# Patient Record
Sex: MALE | Race: WHITE | NOT HISPANIC OR LATINO | Employment: OTHER | URBAN - METROPOLITAN AREA
[De-identification: names, ages, dates, MRNs, and addresses within clinical notes are randomized per-mention and may not be internally consistent; named-entity substitution may affect disease eponyms.]

---

## 2018-11-07 ENCOUNTER — OFFICE VISIT CONVERTED (OUTPATIENT)
Dept: SURGERY | Facility: CLINIC | Age: 78
End: 2018-11-07
Attending: NURSE PRACTITIONER

## 2019-02-22 ENCOUNTER — HOSPITAL ENCOUNTER (OUTPATIENT)
Dept: SURGERY | Facility: HOSPITAL | Age: 79
Setting detail: HOSPITAL OUTPATIENT SURGERY
Discharge: HOME | End: 2019-02-22
Attending: SURGERY

## 2019-02-22 LAB — GLUCOSE BLD-MCNC: 109 MG/DL (ref 70–99)

## 2019-03-12 ENCOUNTER — OFFICE VISIT CONVERTED (OUTPATIENT)
Dept: SURGERY | Facility: CLINIC | Age: 79
End: 2019-03-12
Attending: NURSE PRACTITIONER

## 2019-04-12 ENCOUNTER — HOSPITAL ENCOUNTER (OUTPATIENT)
Dept: GENERAL RADIOLOGY | Facility: HOSPITAL | Age: 79
Discharge: HOME | End: 2019-04-12
Attending: FAMILY MEDICINE

## 2019-04-19 ENCOUNTER — HOSPITAL ENCOUNTER (OUTPATIENT)
Dept: LAB | Facility: HOSPITAL | Age: 79
Discharge: HOME | End: 2019-04-19
Attending: NURSE PRACTITIONER

## 2019-05-02 ENCOUNTER — OFFICE VISIT CONVERTED (OUTPATIENT)
Dept: ORTHOPEDIC SURGERY | Facility: CLINIC | Age: 79
End: 2019-05-02
Attending: ORTHOPAEDIC SURGERY

## 2019-05-02 ENCOUNTER — CONVERSION ENCOUNTER (OUTPATIENT)
Dept: ORTHOPEDIC SURGERY | Facility: CLINIC | Age: 79
End: 2019-05-02

## 2019-05-30 ENCOUNTER — HOSPITAL ENCOUNTER (OUTPATIENT)
Dept: PREADMISSION TESTING | Facility: HOSPITAL | Age: 79
Discharge: HOME | End: 2019-05-30
Attending: ORTHOPAEDIC SURGERY

## 2019-05-30 LAB
ANION GAP SERPL CALC-SCNC: 14 MMOL/L (ref 8–19)
APTT BLD: 24.7 S (ref 22.2–34.2)
BASOPHILS # BLD AUTO: 0.03 10*3/UL (ref 0–0.2)
BASOPHILS NFR BLD AUTO: 0.5 % (ref 0–3)
BUN SERPL-MCNC: 12 MG/DL (ref 5–25)
BUN/CREAT SERPL: 13 {RATIO} (ref 6–20)
CALCIUM SERPL-MCNC: 9.1 MG/DL (ref 8.7–10.4)
CHLORIDE SERPL-SCNC: 104 MMOL/L (ref 99–111)
CONV ABS IMM GRAN: 0.02 10*3/UL (ref 0–0.2)
CONV CO2: 26 MMOL/L (ref 22–32)
CONV IMMATURE GRAN: 0.3 % (ref 0–1.8)
CREAT UR-MCNC: 0.94 MG/DL (ref 0.7–1.2)
DEPRECATED RDW RBC AUTO: 42.6 FL (ref 35.1–43.9)
EOSINOPHIL # BLD AUTO: 0.21 10*3/UL (ref 0–0.7)
EOSINOPHIL # BLD AUTO: 3.3 % (ref 0–7)
ERYTHROCYTE [DISTWIDTH] IN BLOOD BY AUTOMATED COUNT: 13.3 % (ref 11.6–14.4)
GFR SERPLBLD BASED ON 1.73 SQ M-ARVRAT: >60 ML/MIN/{1.73_M2}
GLUCOSE SERPL-MCNC: 143 MG/DL (ref 70–99)
HBA1C MFR BLD: 15.7 G/DL (ref 14–18)
HCT VFR BLD AUTO: 46.7 % (ref 42–52)
INR PPP: 0.97 (ref 2–3)
LYMPHOCYTES # BLD AUTO: 2.38 10*3/UL (ref 1–5)
MCH RBC QN AUTO: 29.5 PG (ref 27–31)
MCHC RBC AUTO-ENTMCNC: 33.6 G/DL (ref 33–37)
MCV RBC AUTO: 87.6 FL (ref 80–96)
MONOCYTES # BLD AUTO: 0.48 10*3/UL (ref 0.2–1.2)
MONOCYTES NFR BLD AUTO: 7.4 % (ref 3–10)
NEUTROPHILS # BLD AUTO: 3.33 10*3/UL (ref 2–8)
NEUTROPHILS NFR BLD AUTO: 51.6 % (ref 30–85)
NRBC CBCN: 0 % (ref 0–0.7)
OSMOLALITY SERPL CALC.SUM OF ELEC: 292 MOSM/KG (ref 273–304)
PLATELET # BLD AUTO: 171 10*3/UL (ref 130–400)
PMV BLD AUTO: 9.6 FL (ref 9.4–12.4)
POTASSIUM SERPL-SCNC: 4.2 MMOL/L (ref 3.5–5.3)
PROTHROMBIN TIME: 10.1 S (ref 9.4–12)
RBC # BLD AUTO: 5.33 10*6/UL (ref 4.7–6.1)
SODIUM SERPL-SCNC: 140 MMOL/L (ref 135–147)
VARIANT LYMPHS NFR BLD MANUAL: 36.9 % (ref 20–45)
WBC # BLD AUTO: 6.45 10*3/UL (ref 4.8–10.8)

## 2019-06-13 ENCOUNTER — HOSPITAL ENCOUNTER (OUTPATIENT)
Dept: PHYSICAL THERAPY | Facility: CLINIC | Age: 79
Setting detail: RECURRING SERIES
Discharge: HOME | End: 2019-09-18
Attending: ORTHOPAEDIC SURGERY

## 2019-06-20 ENCOUNTER — OFFICE VISIT CONVERTED (OUTPATIENT)
Dept: ORTHOPEDIC SURGERY | Facility: CLINIC | Age: 79
End: 2019-06-20
Attending: PHYSICIAN ASSISTANT

## 2019-08-01 ENCOUNTER — OFFICE VISIT CONVERTED (OUTPATIENT)
Dept: ORTHOPEDIC SURGERY | Facility: CLINIC | Age: 79
End: 2019-08-01
Attending: PHYSICIAN ASSISTANT

## 2019-08-01 ENCOUNTER — CONVERSION ENCOUNTER (OUTPATIENT)
Dept: ORTHOPEDIC SURGERY | Facility: CLINIC | Age: 79
End: 2019-08-01

## 2019-10-03 ENCOUNTER — OFFICE VISIT CONVERTED (OUTPATIENT)
Dept: ORTHOPEDIC SURGERY | Facility: CLINIC | Age: 79
End: 2019-10-03
Attending: ORTHOPAEDIC SURGERY

## 2019-10-03 ENCOUNTER — CONVERSION ENCOUNTER (OUTPATIENT)
Dept: ORTHOPEDIC SURGERY | Facility: CLINIC | Age: 79
End: 2019-10-03

## 2019-10-14 ENCOUNTER — HOSPITAL ENCOUNTER (OUTPATIENT)
Dept: PREADMISSION TESTING | Facility: HOSPITAL | Age: 79
Discharge: HOME | End: 2019-10-14
Attending: ORTHOPAEDIC SURGERY

## 2019-10-14 LAB
ALBUMIN SERPL-MCNC: 4.3 G/DL (ref 3.5–5)
ALBUMIN/GLOB SERPL: 1.5 {RATIO} (ref 1.4–2.6)
ALP SERPL-CCNC: 86 U/L (ref 56–155)
ALT SERPL-CCNC: 18 U/L (ref 10–40)
ANION GAP SERPL CALC-SCNC: 17 MMOL/L (ref 8–19)
APTT BLD: 24.8 S (ref 22.2–34.2)
AST SERPL-CCNC: 16 U/L (ref 15–50)
BASOPHILS # BLD AUTO: 0.03 10*3/UL (ref 0–0.2)
BASOPHILS NFR BLD AUTO: 0.4 % (ref 0–3)
BILIRUB SERPL-MCNC: 0.39 MG/DL (ref 0.2–1.3)
BUN SERPL-MCNC: 13 MG/DL (ref 5–25)
BUN/CREAT SERPL: 18 {RATIO} (ref 6–20)
CALCIUM SERPL-MCNC: 9.2 MG/DL (ref 8.7–10.4)
CHLORIDE SERPL-SCNC: 102 MMOL/L (ref 99–111)
CONV ABS IMM GRAN: 0.03 10*3/UL (ref 0–0.2)
CONV CO2: 23 MMOL/L (ref 22–32)
CONV IMMATURE GRAN: 0.4 % (ref 0–1.8)
CONV TOTAL PROTEIN: 7.1 G/DL (ref 6.3–8.2)
CREAT UR-MCNC: 0.74 MG/DL (ref 0.7–1.2)
DEPRECATED RDW RBC AUTO: 42.4 FL (ref 35.1–43.9)
EOSINOPHIL # BLD AUTO: 0.28 10*3/UL (ref 0–0.7)
EOSINOPHIL # BLD AUTO: 4 % (ref 0–7)
ERYTHROCYTE [DISTWIDTH] IN BLOOD BY AUTOMATED COUNT: 13.3 % (ref 11.6–14.4)
EST. AVERAGE GLUCOSE BLD GHB EST-MCNC: 134 MG/DL
GFR SERPLBLD BASED ON 1.73 SQ M-ARVRAT: >60 ML/MIN/{1.73_M2}
GLOBULIN UR ELPH-MCNC: 2.8 G/DL (ref 2–3.5)
GLUCOSE SERPL-MCNC: 125 MG/DL (ref 70–99)
HBA1C MFR BLD: 6.3 % (ref 3.5–5.7)
HCT VFR BLD AUTO: 48.1 % (ref 42–52)
HGB BLD-MCNC: 15.7 G/DL (ref 14–18)
INR PPP: 0.92 (ref 2–3)
LYMPHOCYTES # BLD AUTO: 2.47 10*3/UL (ref 1–5)
LYMPHOCYTES NFR BLD AUTO: 34.9 % (ref 20–45)
MCH RBC QN AUTO: 28.4 PG (ref 27–31)
MCHC RBC AUTO-ENTMCNC: 32.6 G/DL (ref 33–37)
MCV RBC AUTO: 87.1 FL (ref 80–96)
MONOCYTES # BLD AUTO: 0.55 10*3/UL (ref 0.2–1.2)
MONOCYTES NFR BLD AUTO: 7.8 % (ref 3–10)
NEUTROPHILS # BLD AUTO: 3.72 10*3/UL (ref 2–8)
NEUTROPHILS NFR BLD AUTO: 52.5 % (ref 30–85)
NRBC CBCN: 0 % (ref 0–0.7)
OSMOLALITY SERPL CALC.SUM OF ELEC: 286 MOSM/KG (ref 273–304)
PLATELET # BLD AUTO: 192 10*3/UL (ref 130–400)
PMV BLD AUTO: 9.6 FL (ref 9.4–12.4)
POTASSIUM SERPL-SCNC: 4.5 MMOL/L (ref 3.5–5.3)
PROTHROMBIN TIME: 10 S (ref 9.4–12)
RBC # BLD AUTO: 5.52 10*6/UL (ref 4.7–6.1)
SODIUM SERPL-SCNC: 137 MMOL/L (ref 135–147)
WBC # BLD AUTO: 7.08 10*3/UL (ref 4.8–10.8)

## 2019-10-28 ENCOUNTER — HOSPITAL ENCOUNTER (OUTPATIENT)
Dept: PHYSICAL THERAPY | Facility: CLINIC | Age: 79
Setting detail: RECURRING SERIES
Discharge: HOME | End: 2020-01-03
Attending: ORTHOPAEDIC SURGERY

## 2019-11-07 ENCOUNTER — CONVERSION ENCOUNTER (OUTPATIENT)
Dept: ORTHOPEDIC SURGERY | Facility: CLINIC | Age: 79
End: 2019-11-07

## 2019-11-07 ENCOUNTER — OFFICE VISIT CONVERTED (OUTPATIENT)
Dept: ORTHOPEDIC SURGERY | Facility: CLINIC | Age: 79
End: 2019-11-07
Attending: ORTHOPAEDIC SURGERY

## 2020-02-10 ENCOUNTER — HOSPITAL ENCOUNTER (OUTPATIENT)
Dept: GENERAL RADIOLOGY | Facility: HOSPITAL | Age: 80
Discharge: HOME | End: 2020-02-10
Attending: FAMILY MEDICINE

## 2020-02-11 ENCOUNTER — HOSPITAL ENCOUNTER (OUTPATIENT)
Dept: GENERAL RADIOLOGY | Facility: HOSPITAL | Age: 80
Discharge: HOME | End: 2020-02-11
Attending: FAMILY MEDICINE

## 2020-02-11 LAB
CREAT BLD-MCNC: 0.9 MG/DL (ref 0.6–1.4)
GFR SERPLBLD BASED ON 1.73 SQ M-ARVRAT: >60 ML/MIN/{1.73_M2}

## 2021-01-11 ENCOUNTER — OFFICE VISIT CONVERTED (OUTPATIENT)
Dept: FAMILY MEDICINE CLINIC | Facility: CLINIC | Age: 81
End: 2021-01-11
Attending: PHYSICIAN ASSISTANT

## 2021-01-11 ENCOUNTER — HOSPITAL ENCOUNTER (OUTPATIENT)
Dept: LAB | Facility: HOSPITAL | Age: 81
Discharge: HOME | End: 2021-01-11
Attending: PHYSICIAN ASSISTANT

## 2021-01-11 ENCOUNTER — CONVERSION ENCOUNTER (OUTPATIENT)
Dept: FAMILY MEDICINE CLINIC | Facility: CLINIC | Age: 81
End: 2021-01-11

## 2021-01-11 LAB
25(OH)D3 SERPL-MCNC: 44.8 NG/ML (ref 30–100)
ALBUMIN SERPL-MCNC: 4.7 G/DL (ref 3.5–5)
ALBUMIN/GLOB SERPL: 1.8 {RATIO} (ref 1.4–2.6)
ALP SERPL-CCNC: 73 U/L (ref 56–155)
ALT SERPL-CCNC: 28 U/L (ref 10–40)
ANION GAP SERPL CALC-SCNC: 21 MMOL/L (ref 8–19)
APPEARANCE UR: CLEAR
AST SERPL-CCNC: 20 U/L (ref 15–50)
BASOPHILS # BLD AUTO: 0.05 10*3/UL (ref 0–0.2)
BASOPHILS NFR BLD AUTO: 0.7 % (ref 0–3)
BILIRUB SERPL-MCNC: 0.66 MG/DL (ref 0.2–1.3)
BILIRUB UR QL: NEGATIVE
BUN SERPL-MCNC: 13 MG/DL (ref 5–25)
BUN/CREAT SERPL: 14 {RATIO} (ref 6–20)
CALCIUM SERPL-MCNC: 9.9 MG/DL (ref 8.7–10.4)
CHLORIDE SERPL-SCNC: 102 MMOL/L (ref 99–111)
CHOLEST SERPL-MCNC: 257 MG/DL (ref 107–200)
CHOLEST/HDLC SERPL: 5.7 {RATIO} (ref 3–6)
COLOR UR: YELLOW
CONV ABS IMM GRAN: 0.03 10*3/UL (ref 0–0.2)
CONV BACTERIA: NEGATIVE
CONV CO2: 20 MMOL/L (ref 22–32)
CONV COLLECTION SOURCE (UA): ABNORMAL
CONV HYALINE CASTS IN URINE MICRO: ABNORMAL /[LPF]
CONV IMMATURE GRAN: 0.4 % (ref 0–1.8)
CONV TOTAL PROTEIN: 7.3 G/DL (ref 6.3–8.2)
CONV UROBILINOGEN IN URINE BY AUTOMATED TEST STRIP: 0.2 {EHRLICHU}/DL (ref 0.1–1)
CREAT UR-MCNC: 0.93 MG/DL (ref 0.7–1.2)
DEPRECATED RDW RBC AUTO: 40.8 FL (ref 35.1–43.9)
EOSINOPHIL # BLD AUTO: 0.22 10*3/UL (ref 0–0.7)
EOSINOPHIL # BLD AUTO: 3 % (ref 0–7)
ERYTHROCYTE [DISTWIDTH] IN BLOOD BY AUTOMATED COUNT: 13.2 % (ref 11.6–14.4)
EST. AVERAGE GLUCOSE BLD GHB EST-MCNC: 174 MG/DL
GFR SERPLBLD BASED ON 1.73 SQ M-ARVRAT: >60 ML/MIN/{1.73_M2}
GLOBULIN UR ELPH-MCNC: 2.6 G/DL (ref 2–3.5)
GLUCOSE SERPL-MCNC: 167 MG/DL (ref 70–99)
GLUCOSE UR QL: 100 MG/DL
HBA1C MFR BLD: 7.7 % (ref 3.5–5.7)
HCT VFR BLD AUTO: 49.2 % (ref 42–52)
HDLC SERPL-MCNC: 45 MG/DL (ref 40–60)
HGB BLD-MCNC: 16.5 G/DL (ref 14–18)
HGB UR QL STRIP: ABNORMAL
IRON SERPL-MCNC: 144 UG/DL (ref 70–180)
KETONES UR QL STRIP: NEGATIVE MG/DL
LDLC SERPL CALC-MCNC: 175 MG/DL (ref 70–100)
LEUKOCYTE ESTERASE UR QL STRIP: ABNORMAL
LYMPHOCYTES # BLD AUTO: 2.58 10*3/UL (ref 1–5)
LYMPHOCYTES NFR BLD AUTO: 35.1 % (ref 20–45)
MCH RBC QN AUTO: 28.9 PG (ref 27–31)
MCHC RBC AUTO-ENTMCNC: 33.5 G/DL (ref 33–37)
MCV RBC AUTO: 86.2 FL (ref 80–96)
MONOCYTES # BLD AUTO: 0.53 10*3/UL (ref 0.2–1.2)
MONOCYTES NFR BLD AUTO: 7.2 % (ref 3–10)
NEUTROPHILS # BLD AUTO: 3.94 10*3/UL (ref 2–8)
NEUTROPHILS NFR BLD AUTO: 53.6 % (ref 30–85)
NITRITE UR QL STRIP: NEGATIVE
NRBC CBCN: 0 % (ref 0–0.7)
OSMOLALITY SERPL CALC.SUM OF ELEC: 292 MOSM/KG (ref 273–304)
PH UR STRIP.AUTO: 5 [PH] (ref 5–8)
PLATELET # BLD AUTO: 207 10*3/UL (ref 130–400)
PMV BLD AUTO: 10.1 FL (ref 9.4–12.4)
POTASSIUM SERPL-SCNC: 4.1 MMOL/L (ref 3.5–5.3)
PROT UR QL: 30 MG/DL
PSA SERPL-MCNC: 5.31 NG/ML (ref 0–4)
RBC # BLD AUTO: 5.71 10*6/UL (ref 4.7–6.1)
RBC #/AREA URNS HPF: ABNORMAL /[HPF]
SODIUM SERPL-SCNC: 139 MMOL/L (ref 135–147)
SP GR UR: 1.02 (ref 1–1.03)
T4 FREE SERPL-MCNC: 1.4 NG/DL (ref 0.9–1.8)
TRIGL SERPL-MCNC: 186 MG/DL (ref 40–150)
TSH SERPL-ACNC: 1.56 M[IU]/L (ref 0.27–4.2)
VLDLC SERPL-MCNC: 37 MG/DL (ref 5–37)
WBC # BLD AUTO: 7.35 10*3/UL (ref 4.8–10.8)
WBC #/AREA URNS HPF: ABNORMAL /[HPF]

## 2021-01-13 ENCOUNTER — HOSPITAL ENCOUNTER (OUTPATIENT)
Dept: LAB | Facility: HOSPITAL | Age: 81
Discharge: HOME | End: 2021-01-13
Attending: PHYSICIAN ASSISTANT

## 2021-01-13 LAB
APPEARANCE UR: CLEAR
BILIRUB UR QL: NEGATIVE
COLOR UR: YELLOW
CONV BACTERIA: NEGATIVE
CONV COLLECTION SOURCE (UA): ABNORMAL
CONV UROBILINOGEN IN URINE BY AUTOMATED TEST STRIP: 0.2 {EHRLICHU}/DL (ref 0.1–1)
GLUCOSE UR QL: NEGATIVE MG/DL
HGB UR QL STRIP: ABNORMAL
KETONES UR QL STRIP: NEGATIVE MG/DL
LEUKOCYTE ESTERASE UR QL STRIP: NEGATIVE
NITRITE UR QL STRIP: NEGATIVE
PH UR STRIP.AUTO: 5 [PH] (ref 5–8)
PROT UR QL: NEGATIVE MG/DL
RBC #/AREA URNS HPF: ABNORMAL /[HPF]
SP GR UR: 1.02 (ref 1–1.03)
WBC #/AREA URNS HPF: ABNORMAL /[HPF]

## 2021-01-21 ENCOUNTER — CONVERSION ENCOUNTER (OUTPATIENT)
Dept: SURGERY | Facility: CLINIC | Age: 81
End: 2021-01-21

## 2021-01-21 ENCOUNTER — OFFICE VISIT CONVERTED (OUTPATIENT)
Dept: SURGERY | Facility: CLINIC | Age: 81
End: 2021-01-21
Attending: NURSE PRACTITIONER

## 2021-01-21 ENCOUNTER — HOSPITAL ENCOUNTER (OUTPATIENT)
Dept: SURGERY | Facility: CLINIC | Age: 81
Discharge: HOME | End: 2021-01-21
Attending: NURSE PRACTITIONER

## 2021-01-21 LAB
APPEARANCE UR: CLEAR
BILIRUB UR QL: NEGATIVE
COLOR UR: YELLOW
CONV COLLECTION SOURCE (UA): NORMAL
CONV UROBILINOGEN IN URINE BY AUTOMATED TEST STRIP: 0.2 {EHRLICHU}/DL (ref 0.1–1)
GLUCOSE UR QL: NEGATIVE MG/DL
HGB UR QL STRIP: NEGATIVE
KETONES UR QL STRIP: NEGATIVE MG/DL
LEUKOCYTE ESTERASE UR QL STRIP: NEGATIVE
NITRITE UR QL STRIP: NEGATIVE
PH UR STRIP.AUTO: 5 [PH] (ref 5–8)
PROT UR QL: NORMAL MG/DL
SP GR UR: 1.02 (ref 1–1.03)

## 2021-01-22 ENCOUNTER — HOSPITAL ENCOUNTER (OUTPATIENT)
Dept: CT IMAGING | Facility: HOSPITAL | Age: 81
Discharge: HOME | End: 2021-01-22
Attending: NURSE PRACTITIONER

## 2021-01-23 LAB — BACTERIA UR CULT: NORMAL

## 2021-01-29 ENCOUNTER — CONVERSION ENCOUNTER (OUTPATIENT)
Dept: ORTHOPEDIC SURGERY | Facility: CLINIC | Age: 81
End: 2021-01-29

## 2021-01-29 ENCOUNTER — OFFICE VISIT CONVERTED (OUTPATIENT)
Dept: ORTHOPEDIC SURGERY | Facility: CLINIC | Age: 81
End: 2021-01-29
Attending: ORTHOPAEDIC SURGERY

## 2021-02-03 ENCOUNTER — OFFICE VISIT CONVERTED (OUTPATIENT)
Dept: UROLOGY | Facility: CLINIC | Age: 81
End: 2021-02-03
Attending: UROLOGY

## 2021-02-05 ENCOUNTER — OFFICE VISIT CONVERTED (OUTPATIENT)
Dept: ORTHOPEDIC SURGERY | Facility: CLINIC | Age: 81
End: 2021-02-05
Attending: ORTHOPAEDIC SURGERY

## 2021-05-10 NOTE — H&P
History and Physical      Patient Name: Lupillo Toledo   Patient ID: [id number]   Sex: Male   YOB: 1940    Primary Care Provider: David Delarosa PA-C   Referring Provider: David Delarosa PA-C    Visit Date: January 29, 2021    Provider: Vipul Cox MD   Location: [facility]   Location Address: [address] [place] [address]  [id number]   Location Phone: [phone]          Chief Complaint  · Bilateral hand pain       History Of Present Illness  Lupillo Toledo is a 80 year old /White male who presents today to [facility].      The patient presents here today for evaluation of bilateral trigger finger. He states it started about 3 years ago on his right middle finger. It is now also occurring bilaterally on his 4th fingers. He states it also affects his right 1st and 2nd fingers as well. He has no other complaints at this time.            Past Medical History  Allergic rhinitis; Allergic rhinitis due to allergen; Allergic rhinitis, chronic; Arthritis; Arthritis; Bladder Disorder; Bowel Disease; Cataract; Cervicalgia; Colon Polyps; Diabetes; Difficulty swallowing; Essential hypertension; Gall Stones; Hepatitis; High cholesterol; Hyperlipemia; Hypertension, essential; Kidney stones; Limb Swelling; Neuralgia; Primary osteoarthritis of hip, right; Right Hip Trochanteric Bursitis; Seasonal allergies         Past Surgical History  Artificial Joints/Limbs; Back; Cataract surgery; Cervical Disk Surgery; Colonoscopy; EGD; Hernia; Hernia Repair; Joint Surgery; Lumbar Disk Surgery         Medication List  Allegra Allergy 60 mg oral tablet; Aspir-81 81 mg oral tablet,delayed release (DR/EC); gabapentin 400 mg oral capsule; nystatin 100,000 unit/gram topical cream; triamterene-hydrochlorothiazid 37.5-25 mg oral tablet         Allergy List  NO KNOWN DRUG ALLERGIES       Allergies Reconciled  Family Medical History  Hypertension; Spine Problems; Renal Calculus; -Mother's Family  History Unknown; Bladder calculus; *No Known Family History; Blood Diseases         Social History  Alcohol (Never); Alcohol Use (Never); Caffeine (Current some day); lives with spouse; ; .; No known infection risk; Recreational Drug Use (Never); Second hand smoke exposure (Never); Tobacco (Former); Working         Review of Systems  · Constitutional  o Denies  o : fever, chills, weight loss  · Cardiovascular  o Denies  o : chest pain, shortness of breath  · Gastrointestinal  o Denies  o : liver disease, heartburn, nausea, blood in stools  · Genitourinary  o Denies  o : painful urination, blood in urine  · Integument  o Denies  o : rash, itching  · Neurologic  o Denies  o : headache, weakness, loss of consciousness  · Musculoskeletal  o Denies  o : painful, swollen joints  · Psychiatric  o Denies  o : drug/alcohol addiction, anxiety, depression      Vitals  Date Time BP Position Site L\R Cuff Size HR RR TEMP (F) WT  HT  BMI kg/m2 BSA m2 O2 Sat FR L/min FiO2        01/29/2021 11:31 AM      70 - R   223lbs 16oz 6'   30.38 2.27 98 %            Physical Examination  · Constitutional  o Appearance  o : well developed, well-nourished, no obvious deformities present  · Head and Face  o Head  o :   § Inspection  § : normocephalic  o Face  o :   § Inspection  § : no facial lesions  · Eyes  o Conjunctivae  o : conjunctivae normal  o Sclerae  o : sclerae white  · Ears, Nose, Mouth and Throat  o Ears  o :   § External Ears  § : appearance within normal limits  § Hearing  § : intact  o Nose  o :   § External Nose  § : appearance normal  · Neck  o Inspection/Palpation  o : normal appearance  o Range of Motion  o : full range of motion  · Respiratory  o Respiratory Effort  o : breathing unlabored  o Inspection of Chest  o : normal appearance  o Auscultation of Lungs  o : no audible wheezing or rales  · Cardiovascular  o Heart  o : regular rate  · Gastrointestinal  o Abdominal Examination  o : soft and  non-tender  · Skin and Subcutaneous Tissue  o General Inspection  o : intact, no rashes  · Psychiatric  o General  o : Alert and oriented x3  o Judgement and Insight  o : judgment and insight intact  o Mood and Affect  o : mood normal, affect appropriate  · Extremities  o Extremities  o : Bilateral hand: Tender over the left vulvar ring finger. Bilateral Trigger and locking on 4th finger, occasionally triggering to the right third finger as well as the right 1st and 2nd finger. Sensation grossly intact. No skin discoloration, atrophy, or swelling.   · Injection Note/Aspiration Note  o Site  o : bilateral ring finger  o Procedure  o : Procedure: After educating the patient, patient gave consent for procedure. After using Chloraprep, the joint space was injected. The patient tolerated the procedure well.  o Medication  o : 80 mg of DepoMedrol with 1cc of 1% Lidocaine          Assessment  · Arthralgia of both hands       Pain in joints of right hand     719.44/M25.541  Pain in joints of left hand     719.44/M25.542  · Bilateral 4th finger Trigger finger     727.03/M65.30      Plan  · Orders  o 2 - Depo-Medrol injection 80mg () - - 01/29/2021   Lot [id number] Exp 11 2021 [employer] Administered by HAYES HE MD  o 2 - Injection of tendon sheath (94690) - - 01/29/2021   Lot 15 [id number] DK Exp 03 2022 [employer] Administered by HAYES HE MD  · Medications  o Medications have been Reconciled  o Transition of Care or Provider Policy  · Instructions  o Reviewed the patient's Past Medical, Social, and Family history as well as the ROS at today's visit, no changes.  o Call or return if worsening symptoms.  o Follow up in 1-2 weeks.  o The above service was scribed by Jacqueline Lucio on my behalf and I attest to the accuracy of the note. jsb  o Discussed conservative treatment options with the patient. Plan for bilateral forth finger trigger finger injections. Discussed the risks and benefits of the  injections. The patient expressed understanding and wished to proceed. He tolerated the injections well. Plan to follow up in 1-2 weeks. May consider trigger injections for the other fingers at that time.   o Electronically Identified Patient Education Materials Provided Electronically            Electronically Signed by: Jacqueline Lucio MA -Author on February 1, 2021 08:57:34 AM  Electronically Co-signed by: Vipul Cox MD -Reviewer on February 1, 2021 09:26:21 PM

## 2021-05-11 ENCOUNTER — OFFICE VISIT CONVERTED (OUTPATIENT)
Dept: ORTHOPEDIC SURGERY | Facility: CLINIC | Age: 81
End: 2021-05-11
Attending: ORTHOPAEDIC SURGERY

## 2021-05-14 VITALS
HEIGHT: 72 IN | HEART RATE: 77 BPM | WEIGHT: 217.25 LBS | DIASTOLIC BLOOD PRESSURE: 81 MMHG | BODY MASS INDEX: 29.42 KG/M2 | SYSTOLIC BLOOD PRESSURE: 160 MMHG

## 2021-05-14 VITALS — HEART RATE: 70 BPM | BODY MASS INDEX: 30.34 KG/M2 | OXYGEN SATURATION: 98 % | HEIGHT: 72 IN | WEIGHT: 224 LBS

## 2021-05-14 VITALS
DIASTOLIC BLOOD PRESSURE: 75 MMHG | HEART RATE: 69 BPM | BODY MASS INDEX: 30.34 KG/M2 | OXYGEN SATURATION: 96 % | SYSTOLIC BLOOD PRESSURE: 149 MMHG | HEIGHT: 72 IN | WEIGHT: 224 LBS

## 2021-05-14 VITALS — HEIGHT: 72 IN | WEIGHT: 220 LBS | BODY MASS INDEX: 29.8 KG/M2 | RESPIRATION RATE: 14 BRPM

## 2021-05-14 VITALS — OXYGEN SATURATION: 98 % | BODY MASS INDEX: 29.8 KG/M2 | WEIGHT: 220 LBS | HEIGHT: 72 IN | HEART RATE: 78 BPM

## 2021-05-14 NOTE — PROGRESS NOTES
Progress Note      Patient Name: Lupillo Toledo   Patient ID: [id number]   Sex: Male   YOB: 1940    Primary Care Provider: David Delarosa PA-C   Referring Provider: David Delarosa PA-C    Visit Date: February 5, 2021    Provider: Vipul Cox MD   Location: [facility]   Location Address: [address] [place] [address]  [id number]   Location Phone: [phone]          Chief Complaint  · Bilateral middle finger       History Of Present Illness  Lupillo Toledo is a 80 year old /White male who presents today to [facility].      The patient presents here today for follow up evaluation of bilateral hand pain. The patient has received injections in his hands for multiple trigger fingers. The patient is here today requesting bilateral middle finger injections. He claims the injections he received last week in his bilateral ring finger. He states the injections helped.       Past Medical History  Allergic rhinitis due to allergen; Allergic rhinitis, chronic; Arthritis; Bladder Disorder; Bowel Disease; Cataract; Cervicalgia; Colon Polyps; Diabetes; Difficulty swallowing; Essential hypertension; Gall Stones; Hepatitis; High cholesterol; Hyperlipemia; Hypertension, essential; Kidney stones; Limb Swelling; Neuralgia; Primary osteoarthritis of hip, right; Right Hip Trochanteric Bursitis; Seasonal allergies         Past Surgical History  Artificial Joints/Limbs; Back; Cataract surgery; Cervical Disk Surgery; Colonoscopy; EGD; Hernia; Hernia Repair; Joint Surgery; Lumbar Disk Surgery         Medication List  Allegra Allergy 60 mg oral tablet; Aspir-81 81 mg oral tablet,delayed release (DR/EC); gabapentin 400 mg oral capsule; nystatin 100,000 unit/gram topical cream; triamterene-hydrochlorothiazid 37.5-25 mg oral tablet         Allergy List  NO KNOWN DRUG ALLERGIES       Allergies Reconciled  Family Medical History  Hypertension; Spine Problems; Renal Calculus; -Mother's Family  History Unknown; Bladder calculus; Blood Diseases         Social History  Alcohol (Never); Caffeine (Current some day); lives with spouse; ; No known infection risk; Recreational Drug Use (Never); Second hand smoke exposure (Never); Tobacco (Former); Working         Review of Systems  · Constitutional  o Denies  o : fever, chills, weight loss  · Cardiovascular  o Denies  o : chest pain, shortness of breath  · Gastrointestinal  o Denies  o : liver disease, heartburn, nausea, blood in stools  · Genitourinary  o Denies  o : painful urination, blood in urine  · Integument  o Denies  o : rash, itching  · Neurologic  o Denies  o : headache, weakness, loss of consciousness  · Musculoskeletal  o Denies  o : painful, swollen joints  · Psychiatric  o Denies  o : drug/alcohol addiction, anxiety, depression      Vitals  Date Time BP Position Site L\R Cuff Size HR RR TEMP (F) WT  HT  BMI kg/m2 BSA m2 O2 Sat FR L/min FiO2        02/05/2021 09:36 AM      78 - R   220lbs 0oz 6'   29.84 2.25 98 %            Physical Examination  · Constitutional  o Appearance  o : well developed, well-nourished, no obvious deformities present  · Head and Face  o Head  o :   § Inspection  § : normocephalic  o Face  o :   § Inspection  § : no facial lesions  · Eyes  o Conjunctivae  o : conjunctivae normal  o Sclerae  o : sclerae white  · Ears, Nose, Mouth and Throat  o Ears  o :   § External Ears  § : appearance within normal limits  § Hearing  § : intact  o Nose  o :   § External Nose  § : appearance normal  · Neck  o Inspection/Palpation  o : normal appearance  o Range of Motion  o : full range of motion  · Respiratory  o Respiratory Effort  o : breathing unlabored  o Inspection of Chest  o : normal appearance  o Auscultation of Lungs  o : no audible wheezing or rales  · Cardiovascular  o Heart  o : regular rate  · Gastrointestinal  o Abdominal Examination  o : soft and non-tender  · Skin and Subcutaneous Tissue  o General Inspection  o :  intact, no rashes  · Psychiatric  o General  o : Alert and oriented x3  o Judgement and Insight  o : judgment and insight intact  o Mood and Affect  o : mood normal, affect appropriate  · Extremities  o Extremities  o : Bilateral hand: Tender over the left vulvar ring finger. Bilateral Trigger and locking on 4th finger, occasionally triggering to the bilateral third finger as well as the right 1st and 2nd finger. Sensation grossly intact. No skin discoloration, atrophy, or swelling.   · Injection Note/Aspiration Note  o Site  o : bilateral trigger fingers  o Procedure  o : Procedure: After educating the patient, patient gave consent for procedure. After using Chloraprep, the joint space was injected. The patient tolerated the procedure well.  o Medication  o : 80 mg of DepoMedrol with 1cc of 1% Lidocaine          Assessment  · Arthralgia of both hands       Pain in joints of right hand     719.44/M25.541  Pain in joints of left hand     719.44/M25.542  · Bilateral 3rd digit Trigger finger     727.03/M65.30      Plan  · Orders  o 2 - Depo-Medrol injection 80mg () - - 02/05/2021   Lot [id number] Exp 10 2021 [employer] Administered by HAYES HE MD   o 2 - Injection of tendon sheath (74720) - - 02/05/2021   Lot 15 [id number] DK Exp 03 01 2022 [employer] Administered by HAYES HE MD   · Medications  o Medications have been Reconciled  o Transition of Care or Provider Policy  · Instructions  o Reviewed the patient's Past Medical, Social, and Family history as well as the ROS at today's visit, no changes.  o Call or return if worsening symptoms.  o Follow up in 3 months.  o The above service was scribed by Jacqueline Lucio on my behalf and I attest to the accuracy of the note. jsb  o Discussed the treatment options with the patient. Discussed the risks and benefits of bilateral middle finger injections. The patient expressed understanding and wished to proceed. He tolerated the injections well.    o Electronically Identified Patient Education Materials Provided Electronically            Electronically Signed by: Jacqueline Lucio MA -Author on February 8, 2021 10:34:04 AM  Electronically Co-signed by: Vipul Cox MD -Reviewer on February 8, 2021 09:25:31 PM

## 2021-05-14 NOTE — PROGRESS NOTES
Progress Note      Patient Name: Lupillo Toledo   Patient ID: [id number]   Sex: Male   YOB: 1940    Primary Care Provider: David Delarosa PA-C   Referring Provider: David Delarosa PA-C    Visit Date: February 3, 2021    Provider: Jessica Augustine MD   Location: [facility]   Location Address: [address] [place] [address]  [id number]   Location Phone: [phone]          Chief Complaint  · Patient here for urological concerns      History Of Present Illness  The patient is a 80 year old /White male, who presents on referral from David Delarosa PA-C, for an urological evaluation for an elevated PSA. The PSA was noted as elevated on 12/11/2020 and stated to be mildly elevated and at a level of 5.31 . There has not been a repeat PSA since the last elevated one on 01/11/2021   The patient also reports nocturia, 1-2 times a night. Additionally, he denies burning, chills, fever, frequency, hesitancy, incomplete emptying, previous UTI's, and slowing of his stream.   The patient is currently not taking any Urology specific medications.   Petinent studies:  To date, no diagnostic studies have been performed. He has not had any recent care for this condition.      Denies any urinary frequency urgency or dysuria.    Admits to a good urinary stream.  Denies any urinary spitting.    Admits to having gross hematuria several x2 weeks ago.    Denies any scrotum or penis pain.  Denies any ED issues.    Admits to right flank pain.    Admits history of kidney stones.    Admits to nocturia 1-2 times a night.    Previous psa's:  1/21: 5.31  7/10: 3.6  7/08: 5.3  4/06: 3.0             Past Medical History  Allergic rhinitis; Allergic rhinitis due to allergen; Allergic rhinitis, chronic; Arthritis; Arthritis; Bladder Disorder; Bowel Disease; Cataract; Cervicalgia; Colon Polyps; Diabetes; Difficulty swallowing; Essential hypertension; Gall Stones; Hepatitis; High cholesterol; Hyperlipemia;  Hypertension, essential; Kidney stones; Limb Swelling; Neuralgia; Primary osteoarthritis of hip, right; Right Hip Trochanteric Bursitis; Seasonal allergies         Past Surgical History  Artificial Joints/Limbs; Back; Cataract surgery; Cervical Disk Surgery; Colonoscopy; EGD; Hernia; Hernia Repair; Joint Surgery; Lumbar Disk Surgery         Medication List  Allegra Allergy 60 mg oral tablet; Aspir-81 81 mg oral tablet,delayed release (DR/EC); gabapentin 400 mg oral capsule; nystatin 100,000 unit/gram topical cream; triamterene-hydrochlorothiazid 37.5-25 mg oral tablet         Allergy List  NO KNOWN DRUG ALLERGIES       Allergies Reconciled  Family Medical History  Hypertension; Spine Problems; Renal Calculus; -Mother's Family History Unknown; Bladder calculus; *No Known Family History; Blood Diseases         Social History  Alcohol (Never); Alcohol Use (Never); Caffeine (Current some day); lives with spouse; ; .; No known infection risk; Recreational Drug Use (Never); Second hand smoke exposure (Never); Tobacco (Former); Working         Review of Systems  · Constitutional  o Denies  o : fatigue, fever, chills, night sweats      Vitals  Date Time BP Position Site L\R Cuff Size HR RR TEMP (F) WT  HT  BMI kg/m2 BSA m2 O2 Sat FR L/min FiO2 HC       02/03/2021 03:56 /81 Sitting    77 - R   217lbs 4oz 6'   29.46 2.24             Physical Examination  · Constitutional  o Appearance  o : Well nourished, well developed patient in no acute distress. Ambulating without difficulty.  · Neck  o Thyroid  o : Normal size without tenderness, nodules or masses  · Respiratory  o Respiratory Effort  o : Breathing is unlabored without accessory muscle use  o Auscultation of Lungs  o : Normal breath sounds  · Gastrointestinal  o Abdominal Examination  o : Scaphoid abdomen which is non-tender to palpation with normal tone and without rigidity or guarding. Normal bowel sounds. No masses present.  o Liver and spleen  o :  No hepatomegaly present. Liver is non-tender to palpation and spleen is not palpable.  o Hernias  o : No abdominal wall hernias are present.  · Genitourinary  o Penis  o : Normal appearance without lesion, discharge or masses.  · Lymphatic  o Neck  o : No lymphadenopathy present  o Axilla  o : No lymphadenopathy present  o Groin  o : No lymphadenopathy present  · Skin and Subcutaneous Tissue  o General Inspection  o : No rashes, lesions or areas of discoloration present. Skin turgor is normal.  o General Palpation  o : No abnormalities, masses or tenderness on palpation.          Results  · In-Office Procedures  o Lab procedure  § Automated dipstick urinalysis with microscopy (73987)   § Color Ur: Yellow   § Clarity Ur: Clear   § Glucose Ur Ql Strip: 100   § Bilirub Ur Ql Strip: Negative   § Ketones Ur Ql Strip: Negative   § Sp Gr Ur Qn: >=1.030   § Hgb Ur Ql Strip: Negative   § pH Ur-LsCnc: 5.0   § Prot Ur Ql Strip: Trace   § Urobilinogen Ur Strip-mCnc: 0.2   § Nitrite Ur Ql Strip: Negative   § WBC Est Ur Ql Strip: Negative       Assessment  · Elevated prostate specific antigen (PSA)     790.93/R97.20  · Nocturia     788.43/R35.1  · Gross hematuria     599.71/R31.0  · History of kidney stones     V13.01/Z87.442    Problems Reconciled  Plan  · Orders  o MRI prostate wo then w contrast (71506) - 790.93/R97.20 - 02/03/2021  · Medications  o Medications have been Reconciled  o Transition of Care or Provider Policy  · Instructions  o DISCUSSION AND PLAN: I have discussed with the patient that there is no PSA level that can indicate he has prostate cancer. The only way to confirm is with a prostate biopsy. He will have a prostate MRI and I will call him with the results. He will likely need a prostate fusion biopsy.             Electronically Signed by: Jessica Augustine MD -Author on February 3, 2021 04:59:55 PM

## 2021-05-14 NOTE — PROGRESS NOTES
"   Progress Note      Patient Name: Lupillo Toledo   Patient ID: [id number]   Sex: Male   YOB: 1940    Primary Care Provider: David Delarsoa PA-C   Referring Provider: David Delarosa PA-C    Visit Date: January 11, 2021    Provider: David Delarosa PA-C   Location: [facility]   Location Address: [address] [place] [address], [address]  [place], [place]  [id number]   Location Phone: [phone]          Chief Complaint  · new patient to establish care  · Trigger fingers on both hands/multiple fingers  · rash on right hip  · spot on left thumb  · cold all the time      History Of Present Illness  Lupillo Toledo is a 80 year old /White male who presents for evaluation and treatment of: new patient to establish care.      pt presents today as new patient to establish care.    pt previous PCP was Vipul Ornelas.    pt has hx of diabetes and controls it with diet.    pt states he has trigger fingers on both hands/multiple fingers for several years.    pt states he has a rash on his right hip that has been there since his hip surgery in 2018/. pt states the rash \"itches like the dickens\".    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    pt states his wife wants him to mention he stays cold all the time. He thinks she keeps it too cold at night in the house.    Labs 10/19  flu refused    ECHO - done in the past with [facility] - secondary to some dizziness with standing.  But they said it was from birth    HTN - well controlled on triamterene/HCTZ  ARCHIE - controlled with allegra       Past Medical History  Disease Name Date Onset Notes   Allergic rhinitis --  --    Allergic rhinitis, chronic --  --    Arthritis --  --    Arthritis --  --    Bowel Disease --  --    Cataract --  --    Cervicalgia --  --    Colon Polyps --  --    Diabetes --  --    Difficulty swallowing --  --    Gall Stones --  --    Hepatitis --  --    High " cholesterol --  --    Hyperlipemia --  --    Hypertension, essential --  --    Kidney stones --  --    Limb Swelling --  --    Neuralgia --  --    Primary osteoarthritis of hip, right 08/23/2017 --    Right Hip Trochanteric Bursitis 08/18/2017 --    Seasonal allergies --  --          Past Surgical History  Procedure Name Date Notes   Artificial Joints/Limbs --  --    Back --  --    Cataract surgery --  --    Cervical Disk Surgery --  --    Colonoscopy --  --    EGD --  --    Hernia --  --    Hernia Repair --  --    Lumbar Disk Surgery --  --          Medication List  Name Date Started Instructions   Allegra Allergy 60 mg oral tablet  take 1 tablet (60 mg) by oral route 2 times per day   Aspir-81 81 mg oral tablet,delayed release (DR/EC)  take 1 tablet (81 mg) by oral route once daily   gabapentin 400 mg oral capsule  take 1 capsule by oral route   triamterene-hydrochlorothiazid 37.5-25 mg oral tablet  take 1 tablet by oral route once daily         Allergy List  Allergen Name Date Reaction Notes   NO KNOWN DRUG ALLERGIES --  --  --          Family Medical History  Disease Name Relative/Age Notes   Hypertension Father/   --    Spine Problems  --    Renal Calculus Mother/   Mother   *No Known Family History  --    Blood Diseases Brother/  Mother/  Sister/   Mother; Sister; Brother         Social History  Finding Status Start/Stop Quantity Notes   Alcohol Never --/-- --  drinks no   Alcohol Use Never --/-- --  does not drink   Caffeine Current some day --/-- --  drinks occasionally; coffee and tea; 1-2 times per day   lives with spouse --  --/-- --  --     --  --/-- --  --    . --  --/-- --  --    No known infection risk --  --/-- --  --    Recreational Drug Use Never --/-- --  no   Second hand smoke exposure Never --/-- --  no   Tobacco Former --/-- --  former smoker  former smoker; started smoking at age 21; quit smoking at age 34  former smoker   Working --  --/-- --  --          Review of  Systems  · Constitutional  o Denies  o : fever, fatigue, weight loss, weight gain  · Cardiovascular  o Admits  o : lower extremity edema  o Denies  o : claudication, chest pressure, palpitations  · Respiratory  o Denies  o : shortness of breath, wheezing, cough, hemoptysis, dyspnea on exertion  · Gastrointestinal  o Denies  o : nausea, vomiting, diarrhea, constipation, abdominal pain      Vitals  Date Time BP Position Site L\R Cuff Size HR RR TEMP (F) WT  HT  BMI kg/m2 BSA m2 O2 Sat FR L/min FiO2        01/11/2021 10:13 /75 Sitting    69 - R   223lbs 16oz 6'   30.38 2.27 96 %            Physical Examination  · Constitutional  o Appearance  o : overweight, well developed  · Head and Face  o Head  o : normocephalic, atraumatic  · Ears, Nose, Mouth and Throat  o Ears  o :   § External Ears  § : external auditory canal appearance normal, no discharge present  § Otoscopic Examination  § : tympanic membranes pearly white/gray bilaterally  o Nose  o :   § External Nose  § : no lesions noted  § Nasopharynx  § : no discharge present  o Oral Cavity  o :   § Oral Mucosa  § : oral mucosa light pink  o Throat  o :   § Oropharynx  § : tonsils without exudate, no palatal petechiae  · Neck  o Inspection/Palpation  o : normal appearance, no masses or tenderness, trachea midline  o Thyroid  o : gland size normal, nontender, no nodules or masses present on palpation  · Respiratory  o Respiratory Effort  o : breathing unlabored  o Inspection of Chest  o : chest rise symmetric bilaterally  o Auscultation of Lungs  o : clear to auscultation bilaterally throughout inspiration and expiration  · Cardiovascular  o Heart  o :   § Auscultation of Heart  § : regular rate, normal rhythm, diastolic murmur present, no pericardial friction rub  o Peripheral Vascular System  o :   § Extremities  § : 1+ edema present, no cyanosis, no distal hair loss, normal capillary refill  · Lymphatic  o Neck  o : no cervical lymphadenopathy, no  supraclavicular lymphadenopathy  · Psychiatric  o Mood and Affect  o : mood normal, affect appropriate     Right lateral thigh - erythematous serpentigous rash irreg borders    Fingers - bilat triggering of flexor tendons    SKIN - multiple AKs on bilat UE, dorsal surface           Assessment  · Allergic rhinitis due to allergen     477.9/J30.9  · Essential hypertension     401.9/I10  · Screening for depression     V79.0/Z13.89  · Need for influenza vaccination     V04.81/Z23  · Need for pneumococcal vaccination     V03.82/Z23  · Diabetes     250.00/E11.9  · Arthritis     716.90/M19.90  · Tinea corporis     110.5/B35.4  · Aortic stenosis     424.1/I35.0  · Trigger finger     727.03/M65.30  · Bilateral Upper Actinic keratosis     702.0/L57.0      Plan  · Orders  o ACO-18: Negative screen for clinical depression using a standardized tool () - V79.0/Z13.89 - 01/11/2021  o ACO-14: Influenza immunization was not administered for reasons documented () - V04.81/Z23 - 01/11/2021   refused  o Free T4 (30087) - - 01/11/2021  o Hgb A1c [facility] (09967) - - 01/11/2021  o Male Physical Primary Care Panel (CMP, CBC, TSH, Lipid, PSA) [facility] (18939, 77324, , 60118, 34577, 63686) - - 01/11/2021  o Urinalysis with Reflex Microscopy ([facility]) (77721) - - 01/11/2021  o Vitamin D (25-Hydroxy) Level (61098) - - 01/11/2021  o ELIZABETH Report (KASPR) - - 01/11/2021  o ACO-18: Negative screen for clinical depression using a standardized tool () - - 01/11/2021  o ACO-13: Fall Risk Screening with no falls in past year or only one fall without injury in the past year (1101F) - - 01/11/2021  o ACO - Pt declines to or was not able to provide an Advance Care Plan or name a Surrogate Decision Maker (1124F) - - 01/12/2021  o ACO-39: Current medications updated and reviewed (1159F, ) - - 01/11/2021  o Iron level (28959) - - 01/11/2021  o DERMATOLOGY CONSULTATION ([facility]) - 702.0/L57.0 - 01/11/2021  o ORTHOPEDIC CONSULTATION (ORTHO) -  727.03/M65.30 - 01/11/2021  o Urine Drug Screen ([facility]) (42680) - - 01/11/2021  · Medications  o nystatin 100,000 unit/gram topical cream   SIG: apply to the affected area(s) by topical route 2 times per day   DISP: (1) Tube with 0 refills  Prescribed on 01/11/2021     o Medications have been Reconciled  o Transition of Care or Provider Policy  · Instructions  o Patient advised to monitor blood pressure (B/P) at home and journal readings. Patient informed that a B/P reading at home of more than 130/80 is considered hypertension. For readings greater rxpr489/90 or higher patient is advised to follow up in the office with readings for management. Patient advised to limit sodium intake.  o Depression Screen completed and scanned into the EMR under the designated folder within the patient's documents.  o Today's PHQ-9 result is _1__  o Flu vaccine declined.  o Pneumonia vaccine declined.  o Take all medications as prescribed/directed.  o Patient instructed/educated on their diet and exercise program.  o Patient was educated/instructed on their diagnosis, treatment and medications prior to discharge from the clinic today.  o Patient counseled to reduce calorie intake.  o Patient was instructed to exercise regularly.  o Pneumonia vaccine declined.   o Discussed Covid-19 precautions including, but not limited to, social distancing, avoid touching your face, and hand washing.   · Disposition  o Call or Return if symptoms worsen or persist.  o F/U in 6 months  o Care Transition            Electronically Signed by: David Delarosa PA-C -Author on January 12, 2021 06:03:33 AM

## 2021-05-15 VITALS — OXYGEN SATURATION: 96 % | HEIGHT: 72 IN | WEIGHT: 2.94 LBS | BODY MASS INDEX: 0.4 KG/M2 | HEART RATE: 69 BPM

## 2021-05-15 VITALS — HEART RATE: 81 BPM | HEIGHT: 72 IN | OXYGEN SATURATION: 98 %

## 2021-05-15 VITALS — OXYGEN SATURATION: 97 % | HEIGHT: 72 IN | BODY MASS INDEX: 30.34 KG/M2 | WEIGHT: 224 LBS | HEART RATE: 84 BPM

## 2021-05-15 VITALS — BODY MASS INDEX: 28.71 KG/M2 | WEIGHT: 212 LBS | RESPIRATION RATE: 16 BRPM | HEIGHT: 72 IN

## 2021-05-15 VITALS — HEIGHT: 72 IN | OXYGEN SATURATION: 97 % | BODY MASS INDEX: 29.27 KG/M2 | HEART RATE: 84 BPM | WEIGHT: 216.12 LBS

## 2021-05-16 VITALS — WEIGHT: 210 LBS | HEIGHT: 72 IN | BODY MASS INDEX: 28.44 KG/M2 | RESPIRATION RATE: 14 BRPM

## 2021-05-16 VITALS — WEIGHT: 207.25 LBS | BODY MASS INDEX: 28.07 KG/M2 | HEIGHT: 72 IN | RESPIRATION RATE: 14 BRPM

## 2021-05-18 ENCOUNTER — OFFICE VISIT CONVERTED (OUTPATIENT)
Dept: ORTHOPEDIC SURGERY | Facility: CLINIC | Age: 81
End: 2021-05-18
Attending: ORTHOPAEDIC SURGERY

## 2021-06-05 NOTE — PROGRESS NOTES
Progress Note      Patient Name: Lupillo Toledo   Patient ID: [id number]   Sex: Male   YOB: 1940    Primary Care Provider: David Delarosa PA-C   Referring Provider: David Delarosa PA-C    Visit Date: May 18, 2021    Provider: Vipul Cox MD   Location: [facility]   Location Address: [address] [place] [address]  [id number]   Location Phone: [phone]          Chief Complaint  · Left hand pain      History Of Present Illness  Lupillo Toledo is a 80 year old /White male who presents today to [facility].      The patient presents here today for follow up evaluation of the left hand. The patient has been treating his left hand trigger fingers conservatively with steroid injections. The patient is here today for a trigger finger injection in his 3rd and 4th fingers. He has no new complaints.          Past Medical History  Allergic rhinitis due to allergen; Allergic rhinitis, chronic; Arthritis; Bladder disorder; Bowel Disease; Cataract; Cervicalgia; Colon Polyps; Diabetes; Difficulty swallowing; Essential hypertension; Gall Stones; Hepatitis; High cholesterol; Hyperlipemia; Hypertension, essential; Kidney Stones; Limb Swelling; Neuralgia; Primary osteoarthritis of hip, right; Right Hip Trochanteric Bursitis; Seasonal allergies         Past Surgical History  Artificial Joints/Limbs; Back; Cataract surgery; Cervical Disk Surgery; Colonoscopy; EGD; Hernia; Hernia Repair; Joint Surgery; Lumbar Disk Surgery         Medication List  Allegra Allergy 60 mg oral tablet; Aspir-81 81 mg oral tablet,delayed release (DR/EC); gabapentin 300 mg oral capsule; nystatin 100,000 unit/gram topical cream; tamsulosin 0.4 mg oral capsule; triamterene-hydrochlorothiazid 37.5-25 mg oral tablet         Allergy List  NO KNOWN DRUG ALLERGIES         Family Medical History  Hypertension; Spine Problems; Renal Calculus; -Mother's Family History Unknown; Bladder calculus; Blood Diseases  "        Social History  Alcohol (Never); Caffeine (Current some day); lives with spouse; ; No known infection risk; Recreational Drug Use (Never); Second hand smoke exposure (Never); Tobacco (Former); Working         Review of Systems  · Constitutional  o Denies  o : fever, chills, weight loss  · Cardiovascular  o Denies  o : chest pain, shortness of breath  · Gastrointestinal  o Denies  o : liver disease, heartburn, nausea, blood in stools  · Genitourinary  o Denies  o : painful urination, blood in urine  · Integument  o Denies  o : rash, itching  · Neurologic  o Denies  o : headache, weakness, loss of consciousness  · Musculoskeletal  o Denies  o : painful, swollen joints  · Psychiatric  o Denies  o : drug/alcohol addiction, anxiety, depression      Vitals  Date Time BP Position Site L\R Cuff Size HR RR TEMP (F) WT  HT  BMI kg/m2 BSA m2 O2 Sat FR L/min FiO2 HC       05/18/2021 02:14 PM         204lbs 16oz 5'  10\" 29.41 2.14             Physical Examination  · Constitutional  o Appearance  o : well developed, well-nourished, no obvious deformities present  · Head and Face  o Head  o :   § Inspection  § : normocephalic  o Face  o :   § Inspection  § : no facial lesions  · Eyes  o Conjunctivae  o : conjunctivae normal  o Sclerae  o : sclerae white  · Ears, Nose, Mouth and Throat  o Ears  o :   § External Ears  § : appearance within normal limits  § Hearing  § : intact  o Nose  o :   § External Nose  § : appearance normal  · Neck  o Inspection/Palpation  o : normal appearance  o Range of Motion  o : full range of motion  · Respiratory  o Respiratory Effort  o : breathing unlabored  o Inspection of Chest  o : normal appearance  o Auscultation of Lungs  o : no audible wheezing or rales  · Cardiovascular  o Heart  o : regular rate  · Gastrointestinal  o Abdominal Examination  o : soft and non-tender  · Skin and Subcutaneous Tissue  o General Inspection  o : intact, no rashes  · Psychiatric  o General  o : Alert " and oriented x3  o Judgement and Insight  o : judgment and insight intact  o Mood and Affect  o : mood normal, affect appropriate  · Left Hand  o Inspection  o : Stiffness of the left hand. Unable to make a full fist or fully Extend and flex the fingers. triggering and locking of the left 3rd and 4th finger over the Volar MCP over the A1 pulley of the 3rd and 4th fingers.   · Injection Note/Aspiration Note  o Site  o : left trigger finger  o Procedure  o : Procedure: After educating the patient, patient gave consent for procedure. After using Chloraprep, the joint space was injected. The patient tolerated the procedure well.  o Medication  o : 80 mg of DepoMedrol with 1cc of 1% Lidocaine          Assessment  · Arthralgia of left hand     719.44/M25.542  · Left 3rd and 4th fingers Trigger finger     727.03/M65.30      Plan  · Orders  o Depo-Medrol injection 80mg () - - 05/25/2021   Lot [id number] Exp 11 2022 [employer] Administered by HAYES HE MD   o Injection of tendon sheath (38879) - - 05/25/2021   Lot [id number] Exp 12 01 2022 [employer] Administered by HAYES HE MD   · Medications  o Medications have been Reconciled  o Transition of Care or Provider Policy  · Instructions  o Reviewed the patient's Past Medical, Social, and Family history as well as the ROS at today's visit, no changes.  o Call or return if worsening symptoms.  o The above service was scribed by Jacqueline Lucio on my behalf and I attest to the accuracy of the note. jsb  o Discussed the treatment plan with the patient. Discussed the risks and benefits of trigger finger injections in his 3rd and 4th and fingers of the left hand. The patient expressed understanding and wished to proceed. He tolerated the injections well. Follow up in 3 months to recheck.  o Electronically Identified Patient Education Materials Provided Electronically            Electronically Signed by: Jacqueline Lucio MA -Author on May 25, 2021 09:18:34  AM  Electronically Co-signed by: Vipul Cox MD -Reviewer on May 25, 2021 10:23:53 PM

## 2021-06-05 NOTE — PROGRESS NOTES
Progress Note      Patient Name: Lupillo Toleod   Patient ID: [id number]   Sex: Male   YOB: 1940    Primary Care Provider: David Delarosa PA-C   Referring Provider: David Delarosa PA-C    Visit Date: May 11, 2021    Provider: Vipul Cox MD   Location: [facility]   Location Address: [address] [place] [address]  [id number]   Location Phone: [phone]          Chief Complaint  · Right index finger pain   · Right middle finger pain       History Of Present Illness  Lupillo Toledo is a 80 year old /White male who presents today to [facility].      The patient presents here today for follow up evaluation of his right index and middle fingers. The patient has a history or trigger fingers to the right hand. He has been treating this conservatively with steroid injections. The patient states these do provide him with significant relief. He is here today requesting repeat injections today.       Past Medical History  Allergic rhinitis due to allergen; Allergic rhinitis, chronic; Arthritis; Bladder disorder; Bowel Disease; Cataract; Cervicalgia; Colon Polyps; Diabetes; Difficulty swallowing; Essential hypertension; Gall Stones; Hepatitis; High cholesterol; Hyperlipemia; Hypertension, essential; Kidney Stones; Limb Swelling; Neuralgia; Primary osteoarthritis of hip, right; Right Hip Trochanteric Bursitis; Seasonal allergies         Past Surgical History  Artificial Joints/Limbs; Back; Cataract surgery; Cervical Disk Surgery; Colonoscopy; EGD; Hernia; Hernia Repair; Joint Surgery; Lumbar Disk Surgery         Medication List  Allegra Allergy 60 mg oral tablet; Aspir-81 81 mg oral tablet,delayed release (DR/EC); gabapentin 300 mg oral capsule; nystatin 100,000 unit/gram topical cream; tamsulosin 0.4 mg oral capsule; triamterene-hydrochlorothiazid 37.5-25 mg oral tablet         Allergy List  NO KNOWN DRUG ALLERGIES       Allergies Reconciled  Family Medical  History  Hypertension; Spine Problems; Renal Calculus; -Mother's Family History Unknown; Bladder calculus; Blood Diseases         Social History  Alcohol (Never); Caffeine (Current some day); lives with spouse; ; No known infection risk; Recreational Drug Use (Never); Second hand smoke exposure (Never); Tobacco (Former); Working         Review of Systems  · Constitutional  o Denies  o : fever, chills, weight loss  · Cardiovascular  o Denies  o : chest pain, shortness of breath  · Gastrointestinal  o Denies  o : liver disease, heartburn, nausea, blood in stools  · Genitourinary  o Denies  o : painful urination, blood in urine  · Integument  o Denies  o : rash, itching  · Neurologic  o Denies  o : headache, weakness, loss of consciousness  · Musculoskeletal  o Denies  o : painful, swollen joints  · Psychiatric  o Denies  o : drug/alcohol addiction, anxiety, depression      Vitals  Date Time BP Position Site L\R Cuff Size HR RR TEMP (F) WT  HT  BMI kg/m2 BSA m2 O2 Sat FR L/min FiO2        05/11/2021 08:18 AM      80 - R   214lbs 4oz 6'   29.06 2.22 98 %            Physical Examination  · Constitutional  o Appearance  o : well developed, well-nourished, no obvious deformities present  · Head and Face  o Head  o :   § Inspection  § : normocephalic  o Face  o :   § Inspection  § : no facial lesions  · Eyes  o Conjunctivae  o : conjunctivae normal  o Sclerae  o : sclerae white  · Ears, Nose, Mouth and Throat  o Ears  o :   § External Ears  § : appearance within normal limits  § Hearing  § : intact  o Nose  o :   § External Nose  § : appearance normal  · Neck  o Inspection/Palpation  o : normal appearance  o Range of Motion  o : full range of motion  · Respiratory  o Respiratory Effort  o : breathing unlabored  o Inspection of Chest  o : normal appearance  o Auscultation of Lungs  o : no audible wheezing or rales  · Cardiovascular  o Heart  o : regular rate  · Gastrointestinal  o Abdominal Examination  o : soft and  non-tender  · Skin and Subcutaneous Tissue  o General Inspection  o : intact, no rashes  · Psychiatric  o General  o : Alert and oriented x3  o Judgement and Insight  o : judgment and insight intact  o Mood and Affect  o : mood normal, affect appropriate  · Right Hand  o Inspection  o : Stiffness of the right hand. Unable to make a full fist or fully Extend and flex the fingers. triggering and locking of the right index and long finger over the Volar MCP over the A1 pulley of the index and middle finger.   · Injection Note/Aspiration Note  o Site  o : right trigger finger  o Procedure  o : Procedure: After educating the patient, patient gave consent for procedure. After using Chloraprep, the joint space was injected. The patient tolerated the procedure well.  o Medication  o : 80 mg of DepoMedrol with 1cc of 1% Lidocaine          Assessment  · Arthralgia of right hand     719.44/M25.541  · Right hand index and long finger Trigger finger     727.03/M65.30      Plan  · Orders  o 2 - Depo-Medrol injection 80mg () - - 05/11/2021   Lot [id number] Exp 10 2021 [employer] Administered by HAYES HE MD   o 2 - Injection of tendon sheath (80751) - - 05/11/2021   Lot [id number] [id number] Exp 12 01 2022 [employer] Administered by HAYES HE MD   · Medications  o Medications have been Reconciled  o Transition of Care or Provider Policy  · Instructions  o Reviewed the patient's Past Medical, Social, and Family history as well as the ROS at today's visit, no changes.  o Call or return if worsening symptoms.  o The above service was scribed by Jacqueline Lucio on my behalf and I attest to the accuracy of the note. jsb  o Discussed the risks and benefits of trigger finger injections to the right index and long finger. The patient expressed understanding and wished to proceed. He tolerated the injections well. Follow up in 1 week for the left hand trigger finger injections.   o Electronically Identified Patient  Education Materials Provided Electronically            Electronically Signed by: Jacqeuline Lucio MA -Author on May 12, 2021 09:09:35 AM  Electronically Co-signed by: Vipul Cox MD -Reviewer on May 16, 2021 08:28:47 PM

## 2021-07-07 RX ORDER — ASPIRIN 81 MG/1
TABLET ORAL
COMMUNITY
End: 2021-10-15

## 2021-07-07 RX ORDER — TRIAMTERENE AND HYDROCHLOROTHIAZIDE 37.5; 25 MG/1; MG/1
CAPSULE ORAL
COMMUNITY
End: 2021-10-15

## 2021-07-07 RX ORDER — TAMSULOSIN HYDROCHLORIDE 0.4 MG/1
CAPSULE ORAL
COMMUNITY
Start: 2021-04-01 | End: 2022-04-05

## 2021-07-07 RX ORDER — FEXOFENADINE HYDROCHLORIDE 60 MG/1
TABLET, FILM COATED ORAL
COMMUNITY
End: 2021-10-15

## 2021-07-07 RX ORDER — GABAPENTIN 400 MG/1
CAPSULE ORAL
COMMUNITY
End: 2021-10-15

## 2021-07-15 VITALS — HEIGHT: 72 IN | WEIGHT: 214.25 LBS | HEART RATE: 80 BPM | OXYGEN SATURATION: 98 % | BODY MASS INDEX: 29.02 KG/M2

## 2021-07-15 VITALS — HEIGHT: 70 IN | BODY MASS INDEX: 29.35 KG/M2 | WEIGHT: 205 LBS

## 2021-07-16 ENCOUNTER — OFFICE VISIT (OUTPATIENT)
Dept: FAMILY MEDICINE CLINIC | Facility: CLINIC | Age: 81
End: 2021-07-16

## 2021-07-16 VITALS
DIASTOLIC BLOOD PRESSURE: 75 MMHG | SYSTOLIC BLOOD PRESSURE: 137 MMHG | WEIGHT: 199.2 LBS | OXYGEN SATURATION: 96 % | BODY MASS INDEX: 28.52 KG/M2 | HEIGHT: 70 IN | HEART RATE: 75 BPM

## 2021-07-16 DIAGNOSIS — E78.00 PURE HYPERCHOLESTEROLEMIA: ICD-10-CM

## 2021-07-16 DIAGNOSIS — R31.9 HEMATURIA, UNSPECIFIED TYPE: ICD-10-CM

## 2021-07-16 DIAGNOSIS — E11.65 TYPE 2 DIABETES MELLITUS WITH HYPERGLYCEMIA, WITHOUT LONG-TERM CURRENT USE OF INSULIN (HCC): Primary | ICD-10-CM

## 2021-07-16 DIAGNOSIS — R97.20 ELEVATED PSA: ICD-10-CM

## 2021-07-16 DIAGNOSIS — I10 ESSENTIAL HYPERTENSION: ICD-10-CM

## 2021-07-16 PROBLEM — J30.9 ALLERGIC RHINITIS: Status: RESOLVED | Noted: 2021-01-12 | Resolved: 2021-07-16

## 2021-07-16 PROBLEM — R13.10 DIFFICULTY SWALLOWING: Status: ACTIVE | Noted: 2021-07-16

## 2021-07-16 PROBLEM — E78.5 HYPERLIPEMIA: Status: ACTIVE | Noted: 2021-07-16

## 2021-07-16 PROBLEM — M19.90 ARTHRITIS: Status: ACTIVE | Noted: 2021-07-16

## 2021-07-16 PROBLEM — E11.9 DIABETES: Status: ACTIVE | Noted: 2021-07-16

## 2021-07-16 PROBLEM — J30.9 ALLERGIC RHINITIS: Status: ACTIVE | Noted: 2021-01-12

## 2021-07-16 PROBLEM — H26.9 CATARACT: Status: ACTIVE | Noted: 2021-07-16

## 2021-07-16 PROBLEM — N32.9 BLADDER DISORDER: Status: ACTIVE | Noted: 2021-07-16

## 2021-07-16 PROBLEM — K63.9 BOWEL DISEASE: Status: ACTIVE | Noted: 2021-07-16

## 2021-07-16 PROBLEM — J30.2 SEASONAL ALLERGIC RHINITIS: Status: ACTIVE | Noted: 2021-07-16

## 2021-07-16 PROCEDURE — 99214 OFFICE O/P EST MOD 30 MIN: CPT | Performed by: PHYSICIAN ASSISTANT

## 2021-07-16 RX ORDER — BLOOD SUGAR DIAGNOSTIC
1 STRIP MISCELLANEOUS AS NEEDED
Qty: 90 EACH | Refills: 3 | Status: SHIPPED | OUTPATIENT
Start: 2021-07-16 | End: 2022-12-15 | Stop reason: SDUPTHER

## 2021-07-16 RX ORDER — BLOOD SUGAR DIAGNOSTIC
1 STRIP MISCELLANEOUS AS NEEDED
COMMUNITY
End: 2021-07-16 | Stop reason: SDUPTHER

## 2021-07-16 RX ORDER — LANCETS
1 EACH MISCELLANEOUS AS NEEDED
Qty: 90 EACH | Refills: 3 | Status: SHIPPED | OUTPATIENT
Start: 2021-07-16

## 2021-07-16 RX ORDER — LANCETS
1 EACH MISCELLANEOUS AS NEEDED
COMMUNITY
End: 2021-07-16 | Stop reason: SDUPTHER

## 2021-07-16 NOTE — PROGRESS NOTES
"Chief Complaint  Diabetes (6 month follow up)    Subjective          Lupillo Toledo presents to [facility]  Patient is here for a 6 month follow up. He is needing a refill for his test strips and lancets. He is concerned about his blood sugar. It has been running in the 130-140 range. He as completely changed his diet and lost weight.     He had labs in January and does not remember receiving lab results.     Discussed statin, pt wants to wait  Pt will hold ASA    Objective   Vital Signs:   /75   Pulse 75   Ht 177.8 cm (70\")   Wt 90.4 kg (199 lb 3.2 oz)   SpO2 96%   BMI 28.58 kg/m²     Physical Exam  Vitals and nursing note reviewed.   Constitutional:       Appearance: Normal appearance.   HENT:      Head: Normocephalic and atraumatic.   Cardiovascular:      Rate and Rhythm: Normal rate and regular rhythm.      Heart sounds: Murmur heard.     Pulmonary:      Effort: Pulmonary effort is normal.      Breath sounds: Normal breath sounds.   Musculoskeletal:      Cervical back: Neck supple.   Neurological:      Mental Status: He is alert.   Psychiatric:         Mood and Affect: Mood normal.         Behavior: Behavior normal.        Result Review :     Common labs    Common Labsle 1/11/21 1/11/21    1117 1117   Glucose  167 (A)   BUN  13   Creatinine  0.93   Sodium  139   Potassium  4.1   Chloride  102   Calcium  9.9   Albumin  4.7   Total Bilirubin  0.66   Alkaline Phosphatase  73   AST (SGOT)  20   ALT (SGPT)  28   WBC  7.35   Hemoglobin  16.5   Hematocrit  49.2   Platelets  207   Total Cholesterol  257 (A)   Triglycerides  186 (A)   HDL Cholesterol  45   LDL Cholesterol   175 (A)   Hemoglobin A1C 7.7 (A)    PSA  5.31 (A)   (A) Abnormal value       Comments are available for some flowsheets but are not being displayed.                     Assessment and Plan    Diagnoses and all orders for this visit:    1. Type 2 diabetes mellitus with hyperglycemia, without long-term " current use of insulin (CMS/[facility]) (Primary)  -     metFORMIN (Glucophage) 500 MG tablet; Take 1 tablet by mouth 2 (Two) Times a Day With Meals.  Dispense: 90 tablet; Refill: 1  -     Lancets (onetouch ultrasoft) lancets; 1 each by Other route As Needed (Check BS QD). Use as instructed  Dispense: 90 each; Refill: 3  -     glucose blood (OneTouch Ultra) test strip; 1 each by Other route As Needed (Check BS QD). Use as instructed  Dispense: 90 each; Refill: 3  -     CBC & Differential; Future  -     Comprehensive Metabolic Panel; Future  -     TSH Rfx On Abnormal To Free T4; Future  -     Hemoglobin A1c; Future  -     MicroAlbumin, Urine, Random - Urine, Clean Catch; Future    2. Essential hypertension  -     CBC & Differential; Future  -     Comprehensive Metabolic Panel; Future  -     TSH Rfx On Abnormal To Free T4; Future    3. Pure hypercholesterolemia  -     Lipid Panel; Future    4. Elevated PSA  -     PSA DIAGNOSTIC    5. Hematuria, unspecified type  -     Urinalysis With Microscopic If Indicated (No Culture) - Urine, Clean Catch; Future        Follow Up   Return in about 6 months (around 1/16/2022).  Patient was given instructions and counseling regarding his condition or for health maintenance advice. Please see specific information pulled into the AVS if appropriate.

## 2021-07-19 ENCOUNTER — LAB (OUTPATIENT)
Dept: LAB | Facility: HOSPITAL | Age: 81
End: 2021-07-19

## 2021-07-19 DIAGNOSIS — R31.9 HEMATURIA, UNSPECIFIED TYPE: ICD-10-CM

## 2021-07-19 DIAGNOSIS — R82.81 PYURIA: ICD-10-CM

## 2021-07-19 DIAGNOSIS — I10 ESSENTIAL HYPERTENSION: ICD-10-CM

## 2021-07-19 DIAGNOSIS — E11.65 TYPE 2 DIABETES MELLITUS WITH HYPERGLYCEMIA, WITHOUT LONG-TERM CURRENT USE OF INSULIN (HCC): ICD-10-CM

## 2021-07-19 DIAGNOSIS — E78.00 PURE HYPERCHOLESTEROLEMIA: ICD-10-CM

## 2021-07-19 LAB
ALBUMIN SERPL-MCNC: 3.9 G/DL (ref 3.5–5.2)
ALBUMIN UR-MCNC: 4.9 MG/DL
ALBUMIN/GLOB SERPL: 1.6 G/DL
ALP SERPL-CCNC: 60 U/L (ref 39–117)
ALT SERPL W P-5'-P-CCNC: 13 U/L (ref 1–41)
ANION GAP SERPL CALCULATED.3IONS-SCNC: 13.2 MMOL/L (ref 5–15)
AST SERPL-CCNC: 12 U/L (ref 1–40)
BASOPHILS # BLD AUTO: 0.05 10*3/MM3 (ref 0–0.2)
BASOPHILS NFR BLD AUTO: 0.8 % (ref 0–1.5)
BILIRUB SERPL-MCNC: 0.5 MG/DL (ref 0–1.2)
BUN SERPL-MCNC: 14 MG/DL (ref 8–23)
BUN/CREAT SERPL: 17.1 (ref 7–25)
CALCIUM SPEC-SCNC: 8.9 MG/DL (ref 8.6–10.5)
CHLORIDE SERPL-SCNC: 105 MMOL/L (ref 98–107)
CHOLEST SERPL-MCNC: 235 MG/DL (ref 0–200)
CO2 SERPL-SCNC: 21.8 MMOL/L (ref 22–29)
CREAT SERPL-MCNC: 0.82 MG/DL (ref 0.76–1.27)
DEPRECATED RDW RBC AUTO: 44 FL (ref 37–54)
EOSINOPHIL # BLD AUTO: 0.29 10*3/MM3 (ref 0–0.4)
EOSINOPHIL NFR BLD AUTO: 4.7 % (ref 0.3–6.2)
ERYTHROCYTE [DISTWIDTH] IN BLOOD BY AUTOMATED COUNT: 13.3 % (ref 12.3–15.4)
GFR SERPL CREATININE-BSD FRML MDRD: 90 ML/MIN/1.73
GLOBULIN UR ELPH-MCNC: 2.5 GM/DL
GLUCOSE SERPL-MCNC: 118 MG/DL (ref 65–99)
HBA1C MFR BLD: 8.3 % (ref 4.8–5.6)
HCT VFR BLD AUTO: 47.3 % (ref 37.5–51)
HDLC SERPL-MCNC: 40 MG/DL (ref 40–60)
HGB BLD-MCNC: 15.5 G/DL (ref 13–17.7)
IMM GRANULOCYTES # BLD AUTO: 0.02 10*3/MM3 (ref 0–0.05)
IMM GRANULOCYTES NFR BLD AUTO: 0.3 % (ref 0–0.5)
LDLC SERPL CALC-MCNC: 173 MG/DL (ref 0–100)
LDLC/HDLC SERPL: 4.28 {RATIO}
LYMPHOCYTES # BLD AUTO: 2.17 10*3/MM3 (ref 0.7–3.1)
LYMPHOCYTES NFR BLD AUTO: 35.1 % (ref 19.6–45.3)
MCH RBC QN AUTO: 29.5 PG (ref 26.6–33)
MCHC RBC AUTO-ENTMCNC: 32.8 G/DL (ref 31.5–35.7)
MCV RBC AUTO: 90.1 FL (ref 79–97)
MONOCYTES # BLD AUTO: 0.48 10*3/MM3 (ref 0.1–0.9)
MONOCYTES NFR BLD AUTO: 7.8 % (ref 5–12)
NEUTROPHILS NFR BLD AUTO: 3.17 10*3/MM3 (ref 1.7–7)
NEUTROPHILS NFR BLD AUTO: 51.3 % (ref 42.7–76)
NRBC BLD AUTO-RTO: 0 /100 WBC (ref 0–0.2)
PLATELET # BLD AUTO: 198 10*3/MM3 (ref 140–450)
PMV BLD AUTO: 9.8 FL (ref 6–12)
POTASSIUM SERPL-SCNC: 4.3 MMOL/L (ref 3.5–5.2)
PROT SERPL-MCNC: 6.4 G/DL (ref 6–8.5)
PSA SERPL-MCNC: 3.67 NG/ML (ref 0–4)
RBC # BLD AUTO: 5.25 10*6/MM3 (ref 4.14–5.8)
SODIUM SERPL-SCNC: 140 MMOL/L (ref 136–145)
TRIGL SERPL-MCNC: 120 MG/DL (ref 0–150)
TSH SERPL DL<=0.05 MIU/L-ACNC: 1.55 UIU/ML (ref 0.27–4.2)
VLDLC SERPL-MCNC: 22 MG/DL (ref 5–40)
WBC # BLD AUTO: 6.18 10*3/MM3 (ref 3.4–10.8)

## 2021-07-19 PROCEDURE — 80061 LIPID PANEL: CPT

## 2021-07-19 PROCEDURE — 87086 URINE CULTURE/COLONY COUNT: CPT

## 2021-07-19 PROCEDURE — 81001 URINALYSIS AUTO W/SCOPE: CPT

## 2021-07-19 PROCEDURE — 87088 URINE BACTERIA CULTURE: CPT

## 2021-07-19 PROCEDURE — 80053 COMPREHEN METABOLIC PANEL: CPT

## 2021-07-19 PROCEDURE — 84153 ASSAY OF PSA TOTAL: CPT | Performed by: PHYSICIAN ASSISTANT

## 2021-07-19 PROCEDURE — 84443 ASSAY THYROID STIM HORMONE: CPT

## 2021-07-19 PROCEDURE — 82043 UR ALBUMIN QUANTITATIVE: CPT

## 2021-07-19 PROCEDURE — 85025 COMPLETE CBC W/AUTO DIFF WBC: CPT

## 2021-07-19 PROCEDURE — 83036 HEMOGLOBIN GLYCOSYLATED A1C: CPT

## 2021-07-19 PROCEDURE — 36415 COLL VENOUS BLD VENIPUNCTURE: CPT

## 2021-07-19 PROCEDURE — 87186 SC STD MICRODIL/AGAR DIL: CPT

## 2021-07-20 ENCOUNTER — TELEPHONE (OUTPATIENT)
Dept: FAMILY MEDICINE CLINIC | Facility: CLINIC | Age: 81
End: 2021-07-20

## 2021-07-20 DIAGNOSIS — R82.81 PYURIA: Primary | ICD-10-CM

## 2021-07-20 LAB
BACTERIA UR QL AUTO: ABNORMAL /HPF
BILIRUB UR QL STRIP: NEGATIVE
CLARITY UR: CLEAR
COLOR UR: YELLOW
GLUCOSE UR STRIP-MCNC: NEGATIVE MG/DL
HGB UR QL STRIP.AUTO: NEGATIVE
HYALINE CASTS UR QL AUTO: ABNORMAL /LPF
KETONES UR QL STRIP: NEGATIVE
LEUKOCYTE ESTERASE UR QL STRIP.AUTO: ABNORMAL
NITRITE UR QL STRIP: NEGATIVE
PH UR STRIP.AUTO: 6 [PH] (ref 5–8)
PROT UR QL STRIP: NEGATIVE
RBC # UR: ABNORMAL /HPF
REF LAB TEST METHOD: ABNORMAL
SP GR UR STRIP: 1.02 (ref 1–1.03)
SQUAMOUS #/AREA URNS HPF: ABNORMAL /HPF
UROBILINOGEN UR QL STRIP: ABNORMAL
WBC UR QL AUTO: ABNORMAL /HPF

## 2021-07-20 NOTE — TELEPHONE ENCOUNTER
----- Message from CINTHIA Quan sent at 7/19/2021  8:41 PM EDT -----  Poor control of DM  Poor control of Lipids  Ensure pt has follow to discuss, can be telehealth if easier

## 2021-07-20 NOTE — TELEPHONE ENCOUNTER
----- Message from CINTHIA Quan sent at 7/20/2021  6:15 AM EDT -----  Urine culture needed, order placed

## 2021-07-22 ENCOUNTER — TELEPHONE (OUTPATIENT)
Dept: FAMILY MEDICINE CLINIC | Facility: CLINIC | Age: 81
End: 2021-07-22

## 2021-07-22 DIAGNOSIS — N30.00 ACUTE CYSTITIS WITHOUT HEMATURIA: Primary | ICD-10-CM

## 2021-07-22 LAB — BACTERIA SPEC AEROBE CULT: ABNORMAL

## 2021-07-22 RX ORDER — NITROFURANTOIN 25; 75 MG/1; MG/1
100 CAPSULE ORAL 2 TIMES DAILY
Qty: 14 CAPSULE | Refills: 0 | Status: SHIPPED | OUTPATIENT
Start: 2021-07-22 | End: 2021-10-15

## 2021-08-03 ENCOUNTER — TELEPHONE (OUTPATIENT)
Dept: FAMILY MEDICINE CLINIC | Facility: CLINIC | Age: 81
End: 2021-08-03

## 2021-08-03 NOTE — TELEPHONE ENCOUNTER
Caller: JERALD ELDER    Relationship to patient: Emergency Contact    Best call back number: [phone]    Patient is needing: PATIENTS WIFE CALLED STATING THE PATIENT FINISHED HIS ANTIBIOTICS FOR BASED OFF THE RESULTS OF HIS URINALYSIS, SHE IS WANTING TO KNOW IF THEY NEED TO CHECK HIS URINE AGAIN AND JUST LET HIS PCP KNOW THAT HE FINISHED THE MEDICATION, SHE WOULD LIKE A CALL BACK. PLEASE ADVISE THANK YOU.

## 2021-08-17 ENCOUNTER — OFFICE VISIT (OUTPATIENT)
Dept: ORTHOPEDIC SURGERY | Facility: CLINIC | Age: 81
End: 2021-08-17

## 2021-08-17 VITALS — HEIGHT: 70 IN | HEART RATE: 74 BPM | BODY MASS INDEX: 28.45 KG/M2 | OXYGEN SATURATION: 97 % | WEIGHT: 198.7 LBS

## 2021-08-17 DIAGNOSIS — M79.642 BILATERAL HAND PAIN: Primary | ICD-10-CM

## 2021-08-17 DIAGNOSIS — M18.11 OSTEOARTHRITIS OF CARPOMETACARPAL (CMC) JOINT OF RIGHT THUMB, UNSPECIFIED OSTEOARTHRITIS TYPE: ICD-10-CM

## 2021-08-17 DIAGNOSIS — M79.641 BILATERAL HAND PAIN: Primary | ICD-10-CM

## 2021-08-17 DIAGNOSIS — M65.30 TRIGGER FINGER OF RIGHT HAND, UNSPECIFIED FINGER: ICD-10-CM

## 2021-08-17 PROCEDURE — 99213 OFFICE O/P EST LOW 20 MIN: CPT | Performed by: ORTHOPAEDIC SURGERY

## 2021-08-17 PROCEDURE — 20550 NJX 1 TENDON SHEATH/LIGAMENT: CPT | Performed by: ORTHOPAEDIC SURGERY

## 2021-08-17 PROCEDURE — 20600 DRAIN/INJ JOINT/BURSA W/O US: CPT | Performed by: ORTHOPAEDIC SURGERY

## 2021-08-17 RX ADMIN — LIDOCAINE HYDROCHLORIDE 1 ML: 10 INJECTION, SOLUTION INFILTRATION; PERINEURAL at 10:57

## 2021-08-17 RX ADMIN — LIDOCAINE HYDROCHLORIDE 1 ML: 10 INJECTION, SOLUTION INFILTRATION; PERINEURAL at 10:58

## 2021-08-17 RX ADMIN — METHYLPREDNISOLONE ACETATE 80 MG: 80 INJECTION, SUSPENSION INTRA-ARTICULAR; INTRALESIONAL; INTRAMUSCULAR; SOFT TISSUE at 10:58

## 2021-08-17 RX ADMIN — METHYLPREDNISOLONE ACETATE 80 MG: 80 INJECTION, SUSPENSION INTRA-ARTICULAR; INTRALESIONAL; INTRAMUSCULAR; SOFT TISSUE at 10:57

## 2021-08-17 NOTE — PROGRESS NOTES
"Chief Complaint  Pain of the Right Hand     Subjective      Lupillo Toledo presents to [facility] for follow up evaluation of his right index and middle fingers. The patient has a history or trigger fingers to the right hand. He has been treating this conservatively with steroid injections. The patient states these do provide him with significant relief. He is here today requesting repeat injections today. He also reports right thumb pain today.     No Known Allergies     Social History     Socioeconomic History   • Marital status:      Spouse name: Not on file   • Number of children: Not on file   • Years of education: Not on file   • Highest education level: Not on file   Tobacco Use   • Smoking status: Former Smoker   • Smokeless tobacco: Never Used   • Tobacco comment: STARTED AT AGE 21 AND STOPPED AT AGE 34   Vaping Use   • Vaping Use: Never used   Substance and Sexual Activity   • Alcohol use: Not Currently   • Drug use: Not Currently        Review of Systems     Objective   Vital Signs:   Pulse 74   Ht 177.8 cm (70\")   Wt 90.1 kg (198 lb 11.2 oz)   SpO2 97%   BMI 28.51 kg/m²       Physical Exam  Constitutional:       Appearance: Normal appearance. He is well-developed and normal weight.   HENT:      Head: Normocephalic.      Right Ear: Hearing and external ear normal.      Left Ear: Hearing and external ear normal.      Nose: Nose normal.   Eyes:      Conjunctiva/sclera: Conjunctivae normal.   Cardiovascular:      Rate and Rhythm: Normal rate.   Pulmonary:      Effort: Pulmonary effort is normal.      Breath sounds: No wheezing or rales.   Abdominal:      Palpations: Abdomen is soft.      Tenderness: There is no abdominal tenderness.   Musculoskeletal:      Cervical back: Normal range of motion.   Skin:     Findings: No rash.   Neurological:      Mental Status: He is alert and oriented to person, place, and time.   Psychiatric:         Mood and Affect: Mood and affect " normal.         Judgment: Judgment normal.       Ortho Exam      Right hand- Tender over the left vulvar ring finger. Bilateral Trigger and locking on 4th finger, occasionally triggering to the bilateral third finger as well as the right 1st and 2nd finger. Sensation grossly intact. No skin discoloration, atrophy, or swelling. Positive grind testing. Tender over the right thumb CMC joint.     Small Joint Arthrocentesis: R thumb CMC  Consent given by: patient  Site marked: site marked  Timeout: Immediately prior to procedure a time out was called to verify the correct patient, procedure, equipment, support staff and site/side marked as required   Supporting Documentation  Indications: pain   Procedure Details  Location: thumb - R thumb CMC  Preparation: Patient was prepped and draped in the usual sterile fashion  Needle gauge: 23G.  Medications administered: 80 mg methylPREDNISolone acetate 80 MG/ML; 1 mL lidocaine 1 %  Patient tolerance: patient tolerated the procedure well with no immediate complications    THIRD FINGER TRIGGER FINGER   Consent given by: patient  Site marked: site marked  Timeout: Immediately prior to procedure a time out was called to verify the correct patient, procedure, equipment, support staff and site/side marked as required   Supporting Documentation  Indications: pain   Procedure Details  Preparation: Patient was prepped and draped in the usual sterile fashion  Needle gauge: 23G.  Medications administered: 80 mg methylPREDNISolone acetate 80 MG/ML; 1 mL lidocaine 1 %  Patient tolerance: patient tolerated the procedure well with no immediate complications    FOURTH TRIGGER FINGER   Consent given by: patient  Site marked: site marked  Timeout: Immediately prior to procedure a time out was called to verify the correct patient, procedure, equipment, support staff and site/side marked as required   Procedure Details  Preparation: Patient was prepped and draped in the usual sterile  fashion  Needle gauge: 23G.  Medications administered: 80 mg methylPREDNISolone acetate 80 MG/ML; 1 mL lidocaine 1 %  Patient tolerance: patient tolerated the procedure well with no immediate complications          X-Ray Report:  bilateral hand X-Ray  Indication: Evaluation of bilateral hand pain  AP and Lateral view(s)  Findings: bilateral advanced degenerative changes to thumb CMC joints. diffuse degenerative changes to the hand. atherosclerosis to vessels of the wrist. No acute fracture or osseous lesions.    Prior studies available for comparison: no           Imaging Results (Most Recent)     None           Result Review :       No results found.           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     Discussed the risks and benefits of a Right thumb CMC, 3rd and 4th trigger finger injections. The patient expressed understanding and wished to proceed. He tolerated the injections well.     Call or return if worsening symptoms.    Follow Up     Follow up in 3 months.       Patient was given instructions and counseling regarding his condition or for health maintenance advice. Please see specific information pulled into the AVS if appropriate.     Scribed for Vipul Cox MD by Jacqueline Lucio.  08/17/21   08:55 EDT  I have personally performed the services described in this document as scribed by the above individual and it is both accurate and complete. Vipul Cox MD 08/18/21

## 2021-08-18 RX ORDER — LIDOCAINE HYDROCHLORIDE 10 MG/ML
1 INJECTION, SOLUTION INFILTRATION; PERINEURAL
Status: COMPLETED | OUTPATIENT
Start: 2021-08-17 | End: 2021-08-17

## 2021-08-18 RX ORDER — METHYLPREDNISOLONE ACETATE 80 MG/ML
80 INJECTION, SUSPENSION INTRA-ARTICULAR; INTRALESIONAL; INTRAMUSCULAR; SOFT TISSUE
Status: COMPLETED | OUTPATIENT
Start: 2021-08-17 | End: 2021-08-17

## 2021-10-12 DIAGNOSIS — E11.65 TYPE 2 DIABETES MELLITUS WITH HYPERGLYCEMIA, WITHOUT LONG-TERM CURRENT USE OF INSULIN (HCC): ICD-10-CM

## 2021-10-15 ENCOUNTER — OFFICE VISIT (OUTPATIENT)
Dept: FAMILY MEDICINE CLINIC | Facility: CLINIC | Age: 81
End: 2021-10-15

## 2021-10-15 VITALS
WEIGHT: 206.2 LBS | DIASTOLIC BLOOD PRESSURE: 73 MMHG | HEIGHT: 72 IN | SYSTOLIC BLOOD PRESSURE: 142 MMHG | BODY MASS INDEX: 27.93 KG/M2 | OXYGEN SATURATION: 97 % | HEART RATE: 65 BPM

## 2021-10-15 DIAGNOSIS — J30.2 SEASONAL ALLERGIC RHINITIS, UNSPECIFIED TRIGGER: Chronic | ICD-10-CM

## 2021-10-15 DIAGNOSIS — E11.65 TYPE 2 DIABETES MELLITUS WITH HYPERGLYCEMIA, WITHOUT LONG-TERM CURRENT USE OF INSULIN (HCC): Chronic | ICD-10-CM

## 2021-10-15 DIAGNOSIS — I10 ESSENTIAL HYPERTENSION: Primary | ICD-10-CM

## 2021-10-15 DIAGNOSIS — E78.00 PURE HYPERCHOLESTEROLEMIA: ICD-10-CM

## 2021-10-15 PROCEDURE — 99214 OFFICE O/P EST MOD 30 MIN: CPT | Performed by: PHYSICIAN ASSISTANT

## 2021-10-15 RX ORDER — LEVOCETIRIZINE DIHYDROCHLORIDE 5 MG/1
5 TABLET, FILM COATED ORAL EVERY EVENING
Qty: 90 TABLET | Refills: 1 | Status: SHIPPED | OUTPATIENT
Start: 2021-10-15 | End: 2022-05-27

## 2021-10-15 NOTE — PROGRESS NOTES
"Chief Complaint  Allergic Rhinitis (follow up) and Diabetes    Subjective          Lupillo Toledo presents to [facility]  History of Present Illness  Pt presents today for routine follow up.    Pt states no new issues or concerns to discuss.    Pt states he was wanting to know if he could get an allergy medication prescribed for his insurance will pay for it.    Labs 7/19/21  A1C 8.3  Pt is taking metformin 500mg daily.  He is no longer taking it twice daily.  This morning it was 135 before fasting.    No CP, SOA, HA      Current Outpatient Medications:   •  glucose blood (OneTouch Ultra) test strip, 1 each by Other route As Needed (Check BS QD). Use as instructed, Disp: 90 each, Rfl: 3  •  Lancets (onetouch ultrasoft) lancets, 1 each by Other route As Needed (Check BS QD). Use as instructed, Disp: 90 each, Rfl: 3  •  metFORMIN (GLUCOPHAGE) 500 MG tablet, TAKE 1 TABLET BY MOUTH TWICE DAILY WITH MEALS, Disp: 90 tablet, Rfl: 1  •  tamsulosin (FLOMAX) 0.4 MG capsule 24 hr capsule, tamsulosin 0.4 mg oral capsule take 1 capsule (0.4 mg) by oral route once daily 1/2 hour following the same meal each day for 30 days 4/1/2021  Active, Disp: , Rfl:   •  levocetirizine (Xyzal) 5 MG tablet, Take 1 tablet by mouth Every Evening., Disp: 90 tablet, Rfl: 1    Objective      Vital Signs:   /73 (BP Location: Right arm)   Pulse 65   Ht 182.9 cm (72\")   Wt 93.5 kg (206 lb 3.2 oz)   SpO2 97%   BMI 27.97 kg/m²    Estimated body mass index is 27.97 kg/m² as calculated from the following:    Height as of this encounter: 182.9 cm (72\").    Weight as of this encounter: 93.5 kg (206 lb 3.2 oz).     Physical Exam  Vitals and nursing note reviewed.   Constitutional:       Appearance: Normal appearance.   HENT:      Head: Normocephalic and atraumatic.   Cardiovascular:      Rate and Rhythm: Normal rate and regular rhythm.      Heart sounds: Murmur heard.       Pulmonary:      Effort: Pulmonary " effort is normal.      Breath sounds: Normal breath sounds.   Musculoskeletal:      Cervical back: Neck supple.   Neurological:      Mental Status: He is alert.   Psychiatric:         Mood and Affect: Mood normal.         Behavior: Behavior normal.        Result Review :     Common labs    Common Labsle 1/11/21 1/11/21 7/19/21 7/19/21 7/19/21 7/19/21 7/19/21 7/19/21    1117 1117 0816 0816 0816 0816 0816 1437   Glucose    118 (A)       Glucose  167 (A)         BUN  13  14       Creatinine  0.93  0.82       eGFR Non African Am    90       Sodium  139  140       Potassium  4.1  4.3       Chloride  102  105       Calcium  9.9  8.9       Albumin  4.7  3.90       Total Bilirubin  0.66  0.5       Alkaline Phosphatase  73  60       AST (SGOT)  20  12       ALT (SGPT)  28  13       WBC  7.35 6.18        Hemoglobin  16.5 15.5        Hematocrit  49.2 47.3        Platelets  207 198        Total Cholesterol     235 (A)      Total Cholesterol  257 (A)         Triglycerides  186 (A)   120      HDL Cholesterol  45   40      LDL Cholesterol   175 (A)   173 (A)      Hemoglobin A1C 7.7 (A)      8.30 (A)    Microalbumin, Urine        4.9   PSA  5.31 (A)    3.670     (A) Abnormal value       Comments are available for some flowsheets but are not being displayed.                         Assessment and Plan      Diagnoses and all orders for this visit:    1. Essential hypertension (Primary)  Overview:  Good control with maxzide    Orders:  -     CBC & Differential; Future  -     Comprehensive Metabolic Panel; Future  -     Lipid Panel; Future  -     Urinalysis With Microscopic If Indicated (No Culture) - Urine, Clean Catch; Future    2. Pure hypercholesterolemia  Overview:  Poor control - very elevated LDL    Orders:  -     Comprehensive Metabolic Panel; Future  -     Lipid Panel; Future    3. Type 2 diabetes mellitus with hyperglycemia, without long-term current use of insulin (HCC)  Comments:  Poor control - taking metformin 500mg  daily  Overview:  Poor control with DM - not taking any meds, watching diet and exercise    Orders:  -     CBC & Differential; Future  -     Comprehensive Metabolic Panel; Future  -     Lipid Panel; Future  -     Urinalysis With Microscopic If Indicated (No Culture) - Urine, Clean Catch; Future  -     Hemoglobin A1c; Future    4. Seasonal allergic rhinitis, unspecified trigger  Comments:  Poor control change from allegra to xyzal  Orders:  -     levocetirizine (Xyzal) 5 MG tablet; Take 1 tablet by mouth Every Evening.  Dispense: 90 tablet; Refill: 1       Follow Up     Return in about 4 months (around 2/15/2022).    I have reviewed information obtained and documented by others and I have confirmed the accuracy of this documented note.     Patient was given instructions and counseling regarding his condition or for health maintenance advice. Please see specific information pulled into the AVS if appropriate.     CINTHIA Quan

## 2021-10-25 ENCOUNTER — LAB (OUTPATIENT)
Dept: LAB | Facility: HOSPITAL | Age: 81
End: 2021-10-25

## 2021-10-25 DIAGNOSIS — E78.00 PURE HYPERCHOLESTEROLEMIA: ICD-10-CM

## 2021-10-25 DIAGNOSIS — I10 ESSENTIAL HYPERTENSION: ICD-10-CM

## 2021-10-25 DIAGNOSIS — E11.65 TYPE 2 DIABETES MELLITUS WITH HYPERGLYCEMIA, WITHOUT LONG-TERM CURRENT USE OF INSULIN (HCC): ICD-10-CM

## 2021-10-25 LAB
ALBUMIN SERPL-MCNC: 4.2 G/DL (ref 3.5–5.2)
ALBUMIN/GLOB SERPL: 1.7 G/DL
ALP SERPL-CCNC: 58 U/L (ref 39–117)
ALT SERPL W P-5'-P-CCNC: 16 U/L (ref 1–41)
ANION GAP SERPL CALCULATED.3IONS-SCNC: 7 MMOL/L (ref 5–15)
AST SERPL-CCNC: 14 U/L (ref 1–40)
BASOPHILS # BLD AUTO: 0.02 10*3/MM3 (ref 0–0.2)
BASOPHILS NFR BLD AUTO: 0.4 % (ref 0–1.5)
BILIRUB SERPL-MCNC: 0.4 MG/DL (ref 0–1.2)
BILIRUB UR QL STRIP: NEGATIVE
BUN SERPL-MCNC: 16 MG/DL (ref 8–23)
BUN/CREAT SERPL: 19.5 (ref 7–25)
CALCIUM SPEC-SCNC: 9.2 MG/DL (ref 8.6–10.5)
CHLORIDE SERPL-SCNC: 104 MMOL/L (ref 98–107)
CHOLEST SERPL-MCNC: 224 MG/DL (ref 0–200)
CLARITY UR: CLEAR
CO2 SERPL-SCNC: 28 MMOL/L (ref 22–29)
COLOR UR: YELLOW
CREAT SERPL-MCNC: 0.82 MG/DL (ref 0.76–1.27)
DEPRECATED RDW RBC AUTO: 41.4 FL (ref 37–54)
EOSINOPHIL # BLD AUTO: 0.12 10*3/MM3 (ref 0–0.4)
EOSINOPHIL NFR BLD AUTO: 2.2 % (ref 0.3–6.2)
ERYTHROCYTE [DISTWIDTH] IN BLOOD BY AUTOMATED COUNT: 12.8 % (ref 12.3–15.4)
GFR SERPL CREATININE-BSD FRML MDRD: 90 ML/MIN/1.73
GLOBULIN UR ELPH-MCNC: 2.5 GM/DL
GLUCOSE SERPL-MCNC: 119 MG/DL (ref 65–99)
GLUCOSE UR STRIP-MCNC: NEGATIVE MG/DL
HBA1C MFR BLD: 6.22 % (ref 4.8–5.6)
HCT VFR BLD AUTO: 45.5 % (ref 37.5–51)
HDLC SERPL-MCNC: 52 MG/DL (ref 40–60)
HGB BLD-MCNC: 15.2 G/DL (ref 13–17.7)
HGB UR QL STRIP.AUTO: NEGATIVE
IMM GRANULOCYTES # BLD AUTO: 0.04 10*3/MM3 (ref 0–0.05)
IMM GRANULOCYTES NFR BLD AUTO: 0.7 % (ref 0–0.5)
KETONES UR QL STRIP: NEGATIVE
LDLC SERPL CALC-MCNC: 155 MG/DL (ref 0–100)
LDLC/HDLC SERPL: 2.94 {RATIO}
LEUKOCYTE ESTERASE UR QL STRIP.AUTO: NEGATIVE
LYMPHOCYTES # BLD AUTO: 1.81 10*3/MM3 (ref 0.7–3.1)
LYMPHOCYTES NFR BLD AUTO: 33.1 % (ref 19.6–45.3)
MCH RBC QN AUTO: 29.8 PG (ref 26.6–33)
MCHC RBC AUTO-ENTMCNC: 33.4 G/DL (ref 31.5–35.7)
MCV RBC AUTO: 89.2 FL (ref 79–97)
MONOCYTES # BLD AUTO: 0.4 10*3/MM3 (ref 0.1–0.9)
MONOCYTES NFR BLD AUTO: 7.3 % (ref 5–12)
NEUTROPHILS NFR BLD AUTO: 3.08 10*3/MM3 (ref 1.7–7)
NEUTROPHILS NFR BLD AUTO: 56.3 % (ref 42.7–76)
NITRITE UR QL STRIP: NEGATIVE
NRBC BLD AUTO-RTO: 0 /100 WBC (ref 0–0.2)
PH UR STRIP.AUTO: 6 [PH] (ref 5–8)
PLATELET # BLD AUTO: 167 10*3/MM3 (ref 140–450)
PMV BLD AUTO: 10 FL (ref 6–12)
POTASSIUM SERPL-SCNC: 4.6 MMOL/L (ref 3.5–5.2)
PROT SERPL-MCNC: 6.7 G/DL (ref 6–8.5)
PROT UR QL STRIP: NEGATIVE
RBC # BLD AUTO: 5.1 10*6/MM3 (ref 4.14–5.8)
SODIUM SERPL-SCNC: 139 MMOL/L (ref 136–145)
SP GR UR STRIP: 1.02 (ref 1–1.03)
TRIGL SERPL-MCNC: 95 MG/DL (ref 0–150)
UROBILINOGEN UR QL STRIP: NORMAL
VLDLC SERPL-MCNC: 17 MG/DL (ref 5–40)
WBC # BLD AUTO: 5.47 10*3/MM3 (ref 3.4–10.8)

## 2021-10-25 PROCEDURE — 36415 COLL VENOUS BLD VENIPUNCTURE: CPT

## 2021-10-25 PROCEDURE — 80053 COMPREHEN METABOLIC PANEL: CPT

## 2021-10-25 PROCEDURE — 80061 LIPID PANEL: CPT

## 2021-10-25 PROCEDURE — 81003 URINALYSIS AUTO W/O SCOPE: CPT

## 2021-10-25 PROCEDURE — 83036 HEMOGLOBIN GLYCOSYLATED A1C: CPT

## 2021-10-25 PROCEDURE — 85025 COMPLETE CBC W/AUTO DIFF WBC: CPT

## 2021-10-26 ENCOUNTER — TELEPHONE (OUTPATIENT)
Dept: FAMILY MEDICINE CLINIC | Facility: CLINIC | Age: 81
End: 2021-10-26

## 2022-01-04 DIAGNOSIS — E11.65 TYPE 2 DIABETES MELLITUS WITH HYPERGLYCEMIA, WITHOUT LONG-TERM CURRENT USE OF INSULIN: ICD-10-CM

## 2022-02-11 ENCOUNTER — OFFICE VISIT (OUTPATIENT)
Dept: ORTHOPEDIC SURGERY | Facility: CLINIC | Age: 82
End: 2022-02-11

## 2022-02-11 VITALS — WEIGHT: 217.8 LBS | BODY MASS INDEX: 29.5 KG/M2 | HEIGHT: 72 IN | HEART RATE: 73 BPM | OXYGEN SATURATION: 96 %

## 2022-02-11 DIAGNOSIS — M65.30 TRIGGER FINGER OF RIGHT HAND, UNSPECIFIED FINGER: Primary | ICD-10-CM

## 2022-02-11 PROCEDURE — 20550 NJX 1 TENDON SHEATH/LIGAMENT: CPT | Performed by: ORTHOPAEDIC SURGERY

## 2022-02-11 RX ADMIN — TRIAMCINOLONE ACETONIDE 40 MG: 40 INJECTION, SUSPENSION INTRA-ARTICULAR; INTRAMUSCULAR at 08:53

## 2022-02-11 RX ADMIN — BUPIVACAINE HYDROCHLORIDE 1 ML: 2.5 INJECTION, SOLUTION INFILTRATION; PERINEURAL at 08:53

## 2022-02-11 NOTE — PROGRESS NOTES
"Chief Complaint  Pain of the Right Hand     Subjective      Lupillo Toledo presents to [facility] for follow up evaluation of his right index, middle and ring fingers. The patient has a history or trigger fingers to the right hand. He has been treating this conservatively with steroid injections. The patient states these do provide him with significant relief. He is here today requesting repeat injections today.     No Known Allergies     Social History     Socioeconomic History   • Marital status:    Tobacco Use   • Smoking status: Former Smoker     Years: 34.00     Types: Cigarettes   • Smokeless tobacco: Never Used   • Tobacco comment: STARTED AT AGE 21 AND STOPPED AT AGE 34   Vaping Use   • Vaping Use: Never used   Substance and Sexual Activity   • Alcohol use: Not Currently   • Drug use: Not Currently        Review of Systems     Objective   Vital Signs:   Pulse 73   Ht 182.9 cm (72\")   Wt 98.8 kg (217 lb 12.8 oz)   SpO2 96%   BMI 29.54 kg/m²       Physical Exam  Constitutional:       Appearance: Normal appearance. The patient is well-developed and normal weight.   HENT:      Head: Normocephalic.      Right Ear: Hearing and external ear normal.      Left Ear: Hearing and external ear normal.      Nose: Nose normal.   Eyes:      Conjunctiva/sclera: Conjunctivae normal.   Cardiovascular:      Rate and Rhythm: Normal rate.   Pulmonary:      Effort: Pulmonary effort is normal.      Breath sounds: No wheezing or rales.   Abdominal:      Palpations: Abdomen is soft.      Tenderness: There is no abdominal tenderness.   Musculoskeletal:      Cervical back: Normal range of motion.   Skin:     Findings: No rash.   Neurological:      Mental Status: The patient is alert and oriented to person, place, and time.   Psychiatric:         Mood and Affect: Mood and affect normal.         Judgment: Judgment normal.       Ortho Exam      Right hand- Tender over the left vulvar ring finger. " Bilateral Trigger and locking on 4th finger, occasionally triggering to the bilateral third finger as well as the right 1st and 2nd finger. Sensation grossly intact. No skin discoloration, atrophy, or swelling. Positive grind testing. Tender over the right thumb CMC joint. inability to fully extend 3rd and 4th fingers. Inability to make a full fist.     3rd finger   Consent given by: patient  Site marked: site marked  Timeout: Immediately prior to procedure a time out was called to verify the correct patient, procedure, equipment, support staff and site/side marked as required   Procedure Details  Location: index finger -   Preparation: Patient was prepped and draped in the usual sterile fashion  Needle gauge: 23G.  Medications administered: 1 mL bupivacaine 0.25 %; 40 mg triamcinolone acetonide 40 MG/ML  Patient tolerance: patient tolerated the procedure well with no immediate complications    4th finger  Consent given by: patient  Site marked: site marked  Timeout: Immediately prior to procedure a time out was called to verify the correct patient, procedure, equipment, support staff and site/side marked as required   Procedure Details  Location: 4th finger   Preparation: Patient was prepped and draped in the usual sterile fashion  Needle gauge: 23G.  Medications administered: 40 mg triamcinolone acetonide 40 MG/ML; 1 mL bupivacaine 0.25 %  Patient tolerance: patient tolerated the procedure well with no immediate complications            Imaging Results (Most Recent)     None           Result Review :       No results found.           Assessment and Plan     DX: trigger finger, 2nd - 4th, right hand    Discussed the treatment plan with the patient.  Due to him being a diabetic, we are only injecting 2 the 3rd and 4th fingers. Discussed the risks and benefits of 3rd and 4th trigger finger steroid injections. The patient expressed understanding and wished to proceed. He tolerated the injections well.     Call or  return if worsening symptoms.    Follow Up     PRN      Patient was given instructions and counseling regarding his condition or for health maintenance advice. Please see specific information pulled into the AVS if appropriate.     Scribed for Vipul Cox MD by Jacqueline Lucio.  02/11/22   08:43 EST    I have personally performed the services described in this document as scribed by the above individual and it is both accurate and complete. Vipul Cox MD 02/13/22

## 2022-02-13 RX ORDER — BUPIVACAINE HYDROCHLORIDE 2.5 MG/ML
1 INJECTION, SOLUTION INFILTRATION; PERINEURAL
Status: COMPLETED | OUTPATIENT
Start: 2022-02-11 | End: 2022-02-11

## 2022-02-13 RX ORDER — TRIAMCINOLONE ACETONIDE 40 MG/ML
40 INJECTION, SUSPENSION INTRA-ARTICULAR; INTRAMUSCULAR
Status: COMPLETED | OUTPATIENT
Start: 2022-02-11 | End: 2022-02-11

## 2022-02-18 ENCOUNTER — OFFICE VISIT (OUTPATIENT)
Dept: ORTHOPEDIC SURGERY | Facility: CLINIC | Age: 82
End: 2022-02-18

## 2022-02-18 VITALS — BODY MASS INDEX: 29.39 KG/M2 | HEIGHT: 72 IN | WEIGHT: 217 LBS

## 2022-02-18 DIAGNOSIS — M65.342 TRIGGER RING FINGER OF LEFT HAND: ICD-10-CM

## 2022-02-18 DIAGNOSIS — M65.332 TRIGGER MIDDLE FINGER OF LEFT HAND: Primary | ICD-10-CM

## 2022-02-18 PROCEDURE — 20550 NJX 1 TENDON SHEATH/LIGAMENT: CPT | Performed by: ORTHOPAEDIC SURGERY

## 2022-02-18 RX ADMIN — TRIAMCINOLONE ACETONIDE 40 MG: 40 INJECTION, SUSPENSION INTRA-ARTICULAR; INTRAMUSCULAR at 08:22

## 2022-02-18 RX ADMIN — BUPIVACAINE HYDROCHLORIDE 1 ML: 2.5 INJECTION, SOLUTION INFILTRATION; PERINEURAL at 08:22

## 2022-02-18 NOTE — PROGRESS NOTES
"Chief Complaint  Pain of the Left Hand     Subjective      Lupillo Toledo presents to [facility] for follow up evaluation of the middle and ring finger trigger finger. He has been treating this conservatively with injections. He is requesting repeat injections today.     No Known Allergies     Social History     Socioeconomic History   • Marital status:    Tobacco Use   • Smoking status: Former Smoker     Years: 34.00     Types: Cigarettes   • Smokeless tobacco: Never Used   • Tobacco comment: STARTED AT AGE 21 AND STOPPED AT AGE 34   Vaping Use   • Vaping Use: Never used   Substance and Sexual Activity   • Alcohol use: Not Currently   • Drug use: Not Currently        Review of Systems     Objective   Vital Signs:   Ht 182.9 cm (72\")   Wt 98.4 kg (217 lb)   BMI 29.43 kg/m²       Physical Exam  Constitutional:       Appearance: Normal appearance. The patient is well-developed and normal weight.   HENT:      Head: Normocephalic.      Right Ear: Hearing and external ear normal.      Left Ear: Hearing and external ear normal.      Nose: Nose normal.   Eyes:      Conjunctiva/sclera: Conjunctivae normal.   Cardiovascular:      Rate and Rhythm: Normal rate.   Pulmonary:      Effort: Pulmonary effort is normal.      Breath sounds: No wheezing or rales.   Abdominal:      Palpations: Abdomen is soft.      Tenderness: There is no abdominal tenderness.   Musculoskeletal:      Cervical back: Normal range of motion.   Skin:     Findings: No rash.   Neurological:      Mental Status: The patient is alert and oriented to person, place, and time.   Psychiatric:         Mood and Affect: Mood and affect normal.         Judgment: Judgment normal.       Ortho Exam    Left hand- Stiffness of the left hand. Unable to make a full fist or fully Extend and flex the fingers. triggering and locking of the left 3rd and 4th finger over the Volar MCP over the A1 pulley of the 3rd and 4th fingers.       Small " Joint Arthrocentesis  Consent given by: patient  Site marked: site marked  Timeout: Immediately prior to procedure a time out was called to verify the correct patient, procedure, equipment, support staff and site/side marked as required   Supporting Documentation  Indications: pain   Procedure Details  Location: long finger (left trigger) -   Preparation: Patient was prepped and draped in the usual sterile fashion  Needle gauge: 23 G.  Medications administered: 40 mg triamcinolone acetonide 40 MG/ML; 1 mL bupivacaine 0.25 %  Patient tolerance: patient tolerated the procedure well with no immediate complications    Small Joint Arthrocentesis  Consent given by: patient  Site marked: site marked  Timeout: Immediately prior to procedure a time out was called to verify the correct patient, procedure, equipment, support staff and site/side marked as required   Supporting Documentation  Indications: pain   Procedure Details  Location: ring finger (left trigger) -   Preparation: Patient was prepped and draped in the usual sterile fashion  Needle gauge: 23 G.  Medications administered: 40 mg triamcinolone acetonide 40 MG/ML; 1 mL bupivacaine 0.25 %  Patient tolerance: patient tolerated the procedure well with no immediate complications            Imaging Results (Most Recent)     None           Result Review :       No results found.           Assessment and Plan     DX: Left middle and ring finger trigger finger    Discussed the treatment plan with the patient.  Discussed the risks and benefits of a left middle and ring finger trigger finger injections. The patient expressed understanding and wished to proceed. He tolerated the injections well.     Call or return if worsening symptoms.    Follow Up     PRN      Patient was given instructions and counseling regarding his condition or for health maintenance advice. Please see specific information pulled into the AVS if appropriate.     Scribed for Vipul Cox MD by  Jacqueline Lucio.  02/18/22   08:11 EST    I have personally performed the services described in this document as scribed by the above individual and it is both accurate and complete. Vipul Cox MD 02/20/22

## 2022-02-20 RX ORDER — TRIAMCINOLONE ACETONIDE 40 MG/ML
40 INJECTION, SUSPENSION INTRA-ARTICULAR; INTRAMUSCULAR
Status: COMPLETED | OUTPATIENT
Start: 2022-02-18 | End: 2022-02-18

## 2022-02-20 RX ORDER — BUPIVACAINE HYDROCHLORIDE 2.5 MG/ML
1 INJECTION, SOLUTION INFILTRATION; PERINEURAL
Status: COMPLETED | OUTPATIENT
Start: 2022-02-18 | End: 2022-02-18

## 2022-04-02 DIAGNOSIS — R97.20 ELEVATED PROSTATE SPECIFIC ANTIGEN (PSA): Primary | ICD-10-CM

## 2022-04-05 RX ORDER — TAMSULOSIN HYDROCHLORIDE 0.4 MG/1
CAPSULE ORAL
Qty: 30 CAPSULE | Refills: 11 | Status: SHIPPED | OUTPATIENT
Start: 2022-04-05 | End: 2023-04-03 | Stop reason: SDUPTHER

## 2022-05-27 ENCOUNTER — TELEMEDICINE (OUTPATIENT)
Dept: FAMILY MEDICINE CLINIC | Facility: CLINIC | Age: 82
End: 2022-05-27

## 2022-05-27 VITALS
WEIGHT: 213.8 LBS | DIASTOLIC BLOOD PRESSURE: 79 MMHG | BODY MASS INDEX: 28.96 KG/M2 | HEIGHT: 72 IN | OXYGEN SATURATION: 98 % | HEART RATE: 72 BPM | SYSTOLIC BLOOD PRESSURE: 146 MMHG

## 2022-05-27 DIAGNOSIS — E78.00 PURE HYPERCHOLESTEROLEMIA: ICD-10-CM

## 2022-05-27 DIAGNOSIS — J30.2 SEASONAL ALLERGIC RHINITIS, UNSPECIFIED TRIGGER: ICD-10-CM

## 2022-05-27 DIAGNOSIS — E11.65 TYPE 2 DIABETES MELLITUS WITH HYPERGLYCEMIA, WITHOUT LONG-TERM CURRENT USE OF INSULIN: ICD-10-CM

## 2022-05-27 DIAGNOSIS — L98.9 SKIN LESION OF NECK: ICD-10-CM

## 2022-05-27 DIAGNOSIS — I10 ESSENTIAL HYPERTENSION: Primary | ICD-10-CM

## 2022-05-27 DIAGNOSIS — Z12.5 SCREENING FOR PROSTATE CANCER: ICD-10-CM

## 2022-05-27 PROCEDURE — 99214 OFFICE O/P EST MOD 30 MIN: CPT | Performed by: PHYSICIAN ASSISTANT

## 2022-05-27 RX ORDER — FEXOFENADINE HYDROCHLORIDE 60 MG/1
60 TABLET, FILM COATED ORAL DAILY
COMMUNITY
End: 2022-05-27

## 2022-05-27 RX ORDER — FEXOFENADINE HCL 180 MG/1
180 TABLET ORAL DAILY
Qty: 90 TABLET | Refills: 1 | Status: SHIPPED | OUTPATIENT
Start: 2022-05-27

## 2022-05-27 RX ORDER — MUPIROCIN CALCIUM 20 MG/G
1 CREAM TOPICAL 3 TIMES DAILY
Qty: 30 G | Refills: 0 | Status: SHIPPED | OUTPATIENT
Start: 2022-05-27

## 2022-05-27 NOTE — PROGRESS NOTES
Chief Complaint  Diabetes and Allergic Rhinitis    Subjective          Lupillo Toledo presents to [facility]  History of Present Illness    Pt presents today for routine follow up.     Pt states that since his last visit, he has noticed that he has a possible skin tag on the right side of his face. He also states that he has a spot on the right side of the back of his neck that he would like looked at. He states that it has been there for three or four months.     Patient would like to discuss seeing if he can be prescribed Allegra because he would like his insurance to pay for it.     Patient also states that he has been seeing Dr Cox and receives 40 mg triamcinolone acetonide 40 MG/ML; 1 mL bupivacaine 0.25 % injections in his left hand every few months. His last injection was in 2/18/22.     LABS: 10/25/21   No CP, SOA, HA     Past Medical History:   Diagnosis Date   • Allergic rhinitis due to allergen 01/12/2021   • Arthritis    • Bladder disorder    • Bowel disease    • BPH (benign prostatic hyperplasia)    • Cataract    • Cervicalgia    • Chronic allergic rhinitis    • Colon polyps    • Diabetes (HCC)    • Difficulty swallowing    • Essential hypertension 01/12/2021   • Gall stones    • Hepatitis    • High cholesterol    • Hyperlipemia    • Hypertension, essential    • Kidney stones    • Limb swelling    • Neuralgia    • Primary osteoarthritis of one hip, right 08/23/2017   • Seasonal allergies    • Trochanteric bursitis of right hip 08/18/2017      Family History   Problem Relation Age of Onset   • Hypertension Father    • Bleeding Disorder Mother    • Other Mother         BLADDER CALCULUS   • Nephrolithiasis Mother    • Bleeding Disorder Sister    • Bleeding Disorder Brother    • Other Other         SPINE PROBLEMS      Past Surgical History:   Procedure Laterality Date   • ANKLE SURGERY     • BACK SURGERY     • CATARACT EXTRACTION     • CERVICAL DISC SURGERY     •  "COLONOSCOPY     • ENDOSCOPY     • HERNIA REPAIR     • LUMBAR DISC SURGERY     • OTHER SURGICAL HISTORY      ARTIFICAL JOINTS/LIMBS        Current Outpatient Medications:   •  glucose blood (OneTouch Ultra) test strip, 1 each by Other route As Needed (Check BS QD). Use as instructed, Disp: 90 each, Rfl: 3  •  Lancets (onetouch ultrasoft) lancets, 1 each by Other route As Needed (Check BS QD). Use as instructed, Disp: 90 each, Rfl: 3  •  fexofenadine (Allegra Allergy) 180 MG tablet, Take 1 tablet by mouth Daily., Disp: 90 tablet, Rfl: 1  •  metFORMIN (GLUCOPHAGE) 500 MG tablet, TAKE 1 TABLET BY MOUTH TWICE DAILY WITH MEALS, Disp: 180 tablet, Rfl: 1  •  mupirocin (Bactroban) 2 % cream, Apply 1 application topically to the appropriate area as directed 3 (Three) Times a Day., Disp: 30 g, Rfl: 0  •  tamsulosin (FLOMAX) 0.4 MG capsule 24 hr capsule, TAKE 1 CAPSULE(0.4 MG) BY MOUTH EVERY DAY 30 MINUTES AFTER THE SAME MEAL, Disp: 30 capsule, Rfl: 11    Objective     Vital Signs:     /79   Pulse 72   Ht 182.9 cm (72\")   Wt 97 kg (213 lb 12.8 oz)   SpO2 98%   BMI 29.00 kg/m²    Estimated body mass index is 29 kg/m² as calculated from the following:    Height as of this encounter: 182.9 cm (72\").    Weight as of this encounter: 97 kg (213 lb 12.8 oz).     Wt Readings from Last 3 Encounters:   05/27/22 97 kg (213 lb 12.8 oz)   02/18/22 98.4 kg (217 lb)   02/11/22 98.8 kg (217 lb 12.8 oz)     BP Readings from Last 3 Encounters:   05/27/22 146/79   10/15/21 142/73   09/09/21 141/65     Physical Exam  Vitals and nursing note reviewed.   Constitutional:       Appearance: Normal appearance.   HENT:      Head: Normocephalic and atraumatic.   Cardiovascular:      Rate and Rhythm: Normal rate and regular rhythm.      Heart sounds: Murmur heard.   Pulmonary:      Effort: Pulmonary effort is normal.      Breath sounds: Normal breath sounds.   Musculoskeletal:      Cervical back: Neck supple.   Neurological:      Mental Status: " He is alert.   Psychiatric:         Mood and Affect: Mood normal.         Behavior: Behavior normal.        Result Review :     Common labs    Common Labsle 7/19/21 7/19/21 7/19/21 7/19/21 7/19/21 7/19/21 10/25/21 10/25/21 10/25/21 10/25/21    0816 0816 0816 0816 0816 1437 0850 0850 0850 0850   Glucose  118 (A)       119 (A)    BUN  14       16    Creatinine  0.82       0.82    eGFR Non African Am  90       90    Sodium  140       139    Potassium  4.3       4.6    Chloride  105       104    Calcium  8.9       9.2    Albumin  3.90       4.20    Total Bilirubin  0.5       0.4    Alkaline Phosphatase  60       58    AST (SGOT)  12       14    ALT (SGPT)  13       16    WBC 6.18      5.47      Hemoglobin 15.5      15.2      Hematocrit 47.3      45.5      Platelets 198      167      Total Cholesterol   235 (A)       224 (A)   Triglycerides   120       95   HDL Cholesterol   40       52   LDL Cholesterol    173 (A)       155 (A)   Hemoglobin A1C     8.30 (A)   6.22 (A)     Microalbumin, Urine      4.9       PSA    3.670         (A) Abnormal value                     Patient Care Team:  Romel Delarosa PA as PCP - General (Physician Assistant)         Assessment and Plan      Diagnoses and all orders for this visit:    1. Essential hypertension (Primary)  Overview:  Good control with maxzide    Orders:  -     Lipid Panel; Future  -     CBC & Differential; Future  -     Comprehensive Metabolic Panel; Future  -     TSH; Future  -     Vitamin D 25 Hydroxy; Future  -     Urinalysis With Culture If Indicated -; Future    2. Pure hypercholesterolemia  Overview:  Poor control - very elevated LDL    Orders:  -     Lipid Panel; Future  -     CBC & Differential; Future  -     Comprehensive Metabolic Panel; Future    3. Type 2 diabetes mellitus with hyperglycemia, without long-term current use of insulin (HCC)  Overview:  Poor control with DM - not taking any meds, watching diet and exercise    Orders:  -     Hemoglobin A1c;  Future    4. Screening for prostate cancer  -     PSA SCREENING; Future    5. Skin lesion of neck  -     mupirocin (Bactroban) 2 % cream; Apply 1 application topically to the appropriate area as directed 3 (Three) Times a Day.  Dispense: 30 g; Refill: 0  -     Ambulatory Referral to Dermatology    6. Seasonal allergic rhinitis, unspecified trigger  -     fexofenadine (Allegra Allergy) 180 MG tablet; Take 1 tablet by mouth Daily.  Dispense: 90 tablet; Refill: 1     Follow Up     Return in about 4 months (around 9/27/2022).    Patient was given instructions and counseling regarding his condition or for health maintenance advice. Please see specific information pulled into the AVS if appropriate.     I have reviewed information obtained and documented by others and I have confirmed the accuracy of this documented note.    CINTHIA Quan

## 2022-06-01 ENCOUNTER — LAB (OUTPATIENT)
Dept: LAB | Facility: HOSPITAL | Age: 82
End: 2022-06-01

## 2022-06-01 DIAGNOSIS — E11.65 TYPE 2 DIABETES MELLITUS WITH HYPERGLYCEMIA, WITHOUT LONG-TERM CURRENT USE OF INSULIN: ICD-10-CM

## 2022-06-01 DIAGNOSIS — E78.00 PURE HYPERCHOLESTEROLEMIA: ICD-10-CM

## 2022-06-01 DIAGNOSIS — Z12.5 SCREENING FOR PROSTATE CANCER: ICD-10-CM

## 2022-06-01 DIAGNOSIS — I10 ESSENTIAL HYPERTENSION: ICD-10-CM

## 2022-06-01 LAB
25(OH)D3 SERPL-MCNC: 30.5 NG/ML (ref 30–100)
ALBUMIN SERPL-MCNC: 4.4 G/DL (ref 3.5–5.2)
ALBUMIN/GLOB SERPL: 2.1 G/DL
ALP SERPL-CCNC: 63 U/L (ref 39–117)
ALT SERPL W P-5'-P-CCNC: 12 U/L (ref 1–41)
ANION GAP SERPL CALCULATED.3IONS-SCNC: 10.7 MMOL/L (ref 5–15)
AST SERPL-CCNC: 13 U/L (ref 1–40)
BASOPHILS # BLD AUTO: 0.04 10*3/MM3 (ref 0–0.2)
BASOPHILS NFR BLD AUTO: 0.6 % (ref 0–1.5)
BILIRUB SERPL-MCNC: 0.8 MG/DL (ref 0–1.2)
BILIRUB UR QL STRIP: NEGATIVE
BUN SERPL-MCNC: 14 MG/DL (ref 8–23)
BUN/CREAT SERPL: 16.9 (ref 7–25)
CALCIUM SPEC-SCNC: 9.1 MG/DL (ref 8.6–10.5)
CHLORIDE SERPL-SCNC: 104 MMOL/L (ref 98–107)
CHOLEST SERPL-MCNC: 250 MG/DL (ref 0–200)
CLARITY UR: CLEAR
CO2 SERPL-SCNC: 23.3 MMOL/L (ref 22–29)
COLOR UR: YELLOW
CREAT SERPL-MCNC: 0.83 MG/DL (ref 0.76–1.27)
DEPRECATED RDW RBC AUTO: 39.6 FL (ref 37–54)
EGFRCR SERPLBLD CKD-EPI 2021: 88.5 ML/MIN/1.73
EOSINOPHIL # BLD AUTO: 0.13 10*3/MM3 (ref 0–0.4)
EOSINOPHIL NFR BLD AUTO: 2 % (ref 0.3–6.2)
ERYTHROCYTE [DISTWIDTH] IN BLOOD BY AUTOMATED COUNT: 12.4 % (ref 12.3–15.4)
GLOBULIN UR ELPH-MCNC: 2.1 GM/DL
GLUCOSE SERPL-MCNC: 119 MG/DL (ref 65–99)
GLUCOSE UR STRIP-MCNC: NEGATIVE MG/DL
HBA1C MFR BLD: 6.3 % (ref 4.8–5.6)
HCT VFR BLD AUTO: 46.2 % (ref 37.5–51)
HDLC SERPL-MCNC: 47 MG/DL (ref 40–60)
HGB BLD-MCNC: 15.6 G/DL (ref 13–17.7)
HGB UR QL STRIP.AUTO: NEGATIVE
IMM GRANULOCYTES # BLD AUTO: 0.02 10*3/MM3 (ref 0–0.05)
IMM GRANULOCYTES NFR BLD AUTO: 0.3 % (ref 0–0.5)
KETONES UR QL STRIP: NEGATIVE
LDLC SERPL CALC-MCNC: 180 MG/DL (ref 0–100)
LDLC/HDLC SERPL: 3.77 {RATIO}
LEUKOCYTE ESTERASE UR QL STRIP.AUTO: NEGATIVE
LYMPHOCYTES # BLD AUTO: 2.28 10*3/MM3 (ref 0.7–3.1)
LYMPHOCYTES NFR BLD AUTO: 35.5 % (ref 19.6–45.3)
MCH RBC QN AUTO: 29.8 PG (ref 26.6–33)
MCHC RBC AUTO-ENTMCNC: 33.8 G/DL (ref 31.5–35.7)
MCV RBC AUTO: 88.3 FL (ref 79–97)
MONOCYTES # BLD AUTO: 0.51 10*3/MM3 (ref 0.1–0.9)
MONOCYTES NFR BLD AUTO: 7.9 % (ref 5–12)
NEUTROPHILS NFR BLD AUTO: 3.44 10*3/MM3 (ref 1.7–7)
NEUTROPHILS NFR BLD AUTO: 53.7 % (ref 42.7–76)
NITRITE UR QL STRIP: NEGATIVE
NRBC BLD AUTO-RTO: 0 /100 WBC (ref 0–0.2)
PH UR STRIP.AUTO: 6.5 [PH] (ref 5–8)
PLATELET # BLD AUTO: 178 10*3/MM3 (ref 140–450)
PMV BLD AUTO: 9.8 FL (ref 6–12)
POTASSIUM SERPL-SCNC: 4.2 MMOL/L (ref 3.5–5.2)
PROT SERPL-MCNC: 6.5 G/DL (ref 6–8.5)
PROT UR QL STRIP: ABNORMAL
PSA SERPL-MCNC: 3.39 NG/ML (ref 0–4)
RBC # BLD AUTO: 5.23 10*6/MM3 (ref 4.14–5.8)
SODIUM SERPL-SCNC: 138 MMOL/L (ref 136–145)
SP GR UR STRIP: 1.02 (ref 1–1.03)
TRIGL SERPL-MCNC: 128 MG/DL (ref 0–150)
TSH SERPL DL<=0.05 MIU/L-ACNC: 2.4 UIU/ML (ref 0.27–4.2)
UROBILINOGEN UR QL STRIP: ABNORMAL
VLDLC SERPL-MCNC: 23 MG/DL (ref 5–40)
WBC NRBC COR # BLD: 6.42 10*3/MM3 (ref 3.4–10.8)

## 2022-06-01 PROCEDURE — 36415 COLL VENOUS BLD VENIPUNCTURE: CPT

## 2022-06-01 PROCEDURE — 81003 URINALYSIS AUTO W/O SCOPE: CPT

## 2022-06-01 PROCEDURE — 80050 GENERAL HEALTH PANEL: CPT

## 2022-06-01 PROCEDURE — 83036 HEMOGLOBIN GLYCOSYLATED A1C: CPT

## 2022-06-01 PROCEDURE — 80061 LIPID PANEL: CPT

## 2022-06-01 PROCEDURE — 82306 VITAMIN D 25 HYDROXY: CPT

## 2022-06-01 PROCEDURE — G0103 PSA SCREENING: HCPCS

## 2022-06-02 ENCOUNTER — TELEPHONE (OUTPATIENT)
Dept: FAMILY MEDICINE CLINIC | Facility: CLINIC | Age: 82
End: 2022-06-02

## 2022-06-02 DIAGNOSIS — E78.00 PURE HYPERCHOLESTEROLEMIA: Primary | ICD-10-CM

## 2022-06-02 RX ORDER — ROSUVASTATIN CALCIUM 10 MG/1
10 TABLET, COATED ORAL NIGHTLY
Qty: 90 TABLET | Refills: 1 | Status: SHIPPED | OUTPATIENT
Start: 2022-06-02 | End: 2022-12-15

## 2022-06-02 NOTE — TELEPHONE ENCOUNTER
----- Message from CINTHIA Quan sent at 6/1/2022  7:11 PM EDT -----  Hyperlipidemia - is pt willing to try statin?  I recommend Crestor 10mg nightly #[id number]    Good control of DM

## 2022-06-06 ENCOUNTER — OFFICE VISIT (OUTPATIENT)
Dept: UROLOGY | Facility: CLINIC | Age: 82
End: 2022-06-06

## 2022-06-06 VITALS — WEIGHT: 213 LBS | HEIGHT: 71 IN | BODY MASS INDEX: 29.82 KG/M2

## 2022-06-06 DIAGNOSIS — R97.20 ELEVATED PSA: Primary | ICD-10-CM

## 2022-06-06 LAB
BILIRUB BLD-MCNC: NEGATIVE MG/DL
CLARITY, POC: CLEAR
COLOR UR: YELLOW
EXPIRATION DATE: 523
GLUCOSE UR STRIP-MCNC: NEGATIVE MG/DL
KETONES UR QL: NEGATIVE
LEUKOCYTE EST, POC: NEGATIVE
Lab: ABNORMAL
NITRITE UR-MCNC: NEGATIVE MG/ML
PH UR: 6 [PH] (ref 5–8)
PROT UR STRIP-MCNC: NEGATIVE MG/DL
RBC # UR STRIP: ABNORMAL /UL
SP GR UR: 1.02 (ref 1–1.03)
UROBILINOGEN UR QL: NORMAL

## 2022-06-06 PROCEDURE — 81003 URINALYSIS AUTO W/O SCOPE: CPT | Performed by: UROLOGY

## 2022-06-06 PROCEDURE — 99213 OFFICE O/P EST LOW 20 MIN: CPT | Performed by: UROLOGY

## 2022-06-06 NOTE — PROGRESS NOTES
"Chief Complaint  Elevated PSA (PSA- 3.390)    Subjective          Lupillo Toledo presents to [facility]  History of Present Illness     Mr. Toledo is a follow-up for elevated PSA. He has had an elevated PSA at 5.3 in 01/2021. Prior to that, it had been in the 3 range typically. He had a prostate MRI in 03/2021 that was negative and showed no specific lesions to suggest prostate cancer. 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 His most recent PSA from 06/2022 was 3.390, which is back down to his baseline.    The patient reports that he is doing well. He states that he feels better than he has felt in years. He reports that he is raising a big yard this year and does his own tilling and mowing. 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    Objective   Vital Signs:   Ht 180.3 cm (71\")   Wt 96.6 kg (213 lb)   BMI 29.71 kg/m²     Physical Exam  Vitals and nursing note reviewed.   Constitutional:       Appearance: Normal appearance. He is well-developed.   Pulmonary:      Effort: Pulmonary effort is normal.      Breath sounds: Normal air entry.   Neurological:      Mental Status: He is alert and oriented to person, place, and time.      Motor: Motor function is intact.   Psychiatric:         Mood and Affect: Mood normal.         Behavior: Behavior normal.          Result Review :   The following data was reviewed by: MIRTHA MARIANO on 06/06/2022:    Results for orders placed or performed in visit on 06/06/22   POC Urinalysis Dipstick, Automated    Specimen: Urine   Result Value Ref Range    Color Yellow Yellow, Straw, Dark Yellow, Karen    Clarity, UA Clear Clear    Specific Gravity  1.025 1.005 - 1.030    pH, Urine 6.0 5.0 - 8.0    Leukocytes Negative Negative    Nitrite, UA Negative Negative    Protein, POC Negative Negative mg/dL    Glucose, UA Negative Negative, 1000 mg/dL (3+) mg/dL    Ketones, UA Negative Negative    Urobilinogen, UA Normal Normal    Bilirubin Negative Negative    " Blood, UA Trace (A) Negative    Lot Number [id number]     Expiration Date 523      PSA    PSA 7/19/21 6/1/22   PSA 3.670 3.390                   Assessment and Plan    Diagnoses and all orders for this visit:    1. Elevated PSA (Primary)  Assessment & Plan:  We will see him in 1 year with PSA prior or sooner if needed.      Orders:  -     POC Urinalysis Dipstick, Automated  -     PSA DIAGNOSTIC; Future          Follow Up       Return in about 1 year (around 6/6/2023) for PSA prior to visit.  Patient was given instructions and counseling regarding his condition or for health maintenance advice. Please see specific information pulled into the AVS if appropriate.     Transcribed from ambient dictation for Jessica Augustine MD by MIRTHA MARIANO.  06/06/22   16:24 EDT    Patient verbalized consent to the visit recording.

## 2022-07-01 DIAGNOSIS — E11.65 TYPE 2 DIABETES MELLITUS WITH HYPERGLYCEMIA, WITHOUT LONG-TERM CURRENT USE OF INSULIN: ICD-10-CM

## 2022-09-26 ENCOUNTER — HOSPITAL ENCOUNTER (OUTPATIENT)
Dept: GENERAL RADIOLOGY | Facility: HOSPITAL | Age: 82
Discharge: HOME | End: 2022-09-26
Admitting: NURSE PRACTITIONER

## 2022-09-26 ENCOUNTER — OFFICE VISIT (OUTPATIENT)
Dept: FAMILY MEDICINE CLINIC | Facility: CLINIC | Age: 82
End: 2022-09-26

## 2022-09-26 VITALS
WEIGHT: 215 LBS | HEART RATE: 75 BPM | HEIGHT: 71 IN | DIASTOLIC BLOOD PRESSURE: 71 MMHG | BODY MASS INDEX: 30.1 KG/M2 | SYSTOLIC BLOOD PRESSURE: 131 MMHG | OXYGEN SATURATION: 95 %

## 2022-09-26 DIAGNOSIS — M79.672 LEFT FOOT PAIN: Primary | ICD-10-CM

## 2022-09-26 DIAGNOSIS — M79.672 LEFT FOOT PAIN: ICD-10-CM

## 2022-09-26 DIAGNOSIS — R01.1 CARDIAC MURMUR: ICD-10-CM

## 2022-09-26 DIAGNOSIS — E11.65 TYPE 2 DIABETES MELLITUS WITH HYPERGLYCEMIA, WITHOUT LONG-TERM CURRENT USE OF INSULIN: ICD-10-CM

## 2022-09-26 DIAGNOSIS — E78.5 HYPERLIPIDEMIA, UNSPECIFIED HYPERLIPIDEMIA TYPE: ICD-10-CM

## 2022-09-26 PROCEDURE — 73630 X-RAY EXAM OF FOOT: CPT

## 2022-09-26 PROCEDURE — 99214 OFFICE O/P EST MOD 30 MIN: CPT | Performed by: NURSE PRACTITIONER

## 2022-09-26 NOTE — ASSESSMENT & PLAN NOTE
Diabetes is in good control with diet and exercise measures, last A1c 6.3%.  Continue current lifestyle

## 2022-09-26 NOTE — PROGRESS NOTES
Chief Complaint  \Left foot pain  SUBJECTIVE  Lupillo Toledo presents to [facility]   Patient presents today with complaints of left side foot and toe pain.  States he was stepping over a baby gate in caught his foot on it, states stubbed his left fifth and fourth digits, states was painful initially but rapidly improved, states did not seem to have any issues but then worked out in the garden quite a bit and a few days later started having worsening pain and swelling, states that the most painful area is now actually on his foot up behind his toes.  States he did have some swelling but wore a compression sock which helped a lot, states it does seem to be getting better, denies any erythema or warmth, no history of gout    Patient also needs to establish care with new provider, previously saw Romel louis, patient is diabetic, states he is diet controlled, was previously on metformin but was able to discontinue this, states otherwise only allergies and cholesterol issues      History of Present Illness  Past Medical History:   Diagnosis Date   • Allergic rhinitis due to allergen 01/12/2021   • Arthritis    • Bladder disorder    • Bowel disease    • BPH (benign prostatic hyperplasia)    • Cataract    • Cervicalgia    • Chronic allergic rhinitis    • Colon polyps    • Diabetes (HCC)    • Difficulty swallowing    • Essential hypertension 01/12/2021   • Gall stones    • Hepatitis    • High cholesterol    • Hyperlipemia    • Hypertension, essential    • Kidney stones    • Limb swelling    • Neuralgia    • Primary osteoarthritis of one hip, right 08/23/2017   • Seasonal allergies    • Trochanteric bursitis of right hip 08/18/2017      Family History   Problem Relation Age of Onset   • Hypertension Father    • Bleeding Disorder Mother    • Other Mother         BLADDER CALCULUS   • Nephrolithiasis Mother    • Bleeding Disorder Sister    • Bleeding Disorder Brother    • Other Other          "SPINE PROBLEMS      Past Surgical History:   Procedure Laterality Date   • ANKLE SURGERY     • BACK SURGERY     • CATARACT EXTRACTION     • CERVICAL DISC SURGERY     • COLONOSCOPY     • ENDOSCOPY     • HERNIA REPAIR     • LUMBAR DISC SURGERY     • OTHER SURGICAL HISTORY      ARTIFICAL JOINTS/LIMBS        Current Outpatient Medications:   •  fexofenadine (Allegra Allergy) 180 MG tablet, Take 1 tablet by mouth Daily., Disp: 90 tablet, Rfl: 1  •  glucose blood (OneTouch Ultra) test strip, 1 each by Other route As Needed (Check BS QD). Use as instructed, Disp: 90 each, Rfl: 3  •  Lancets (onetouch ultrasoft) lancets, 1 each by Other route As Needed (Check BS QD). Use as instructed, Disp: 90 each, Rfl: 3  •  mupirocin (Bactroban) 2 % cream, Apply 1 application topically to the appropriate area as directed 3 (Three) Times a Day., Disp: 30 g, Rfl: 0  •  rosuvastatin (Crestor) 10 MG tablet, Take 1 tablet by mouth Every Night., Disp: 90 tablet, Rfl: 1  •  tamsulosin (FLOMAX) 0.4 MG capsule 24 hr capsule, TAKE 1 CAPSULE(0.4 MG) BY MOUTH EVERY DAY 30 MINUTES AFTER THE SAME MEAL, Disp: 30 capsule, Rfl: 11    OBJECTIVE  Vital Signs:   /71 (BP Location: Left arm)   Pulse 75   Ht 180.3 cm (71\")   Wt 97.5 kg (215 lb)   SpO2 95%   BMI 29.99 kg/m²    Estimated body mass index is 29.99 kg/m² as calculated from the following:    Height as of this encounter: 180.3 cm (71\").    Weight as of this encounter: 97.5 kg (215 lb).     Wt Readings from Last 3 Encounters:   09/26/22 97.5 kg (215 lb)   06/06/22 96.6 kg (213 lb)   05/27/22 97 kg (213 lb 12.8 oz)     BP Readings from Last 3 Encounters:   09/26/22 131/71   05/27/22 146/79   10/15/21 142/73       Physical Exam  Vitals reviewed.   Constitutional:       Appearance: Normal appearance. He is well-developed.   HENT:      Head: Normocephalic and atraumatic.      Right Ear: External ear normal.      Left Ear: External ear normal.   Eyes:      Conjunctiva/sclera: Conjunctivae " normal.      Pupils: Pupils are equal, round, and reactive to light.   Cardiovascular:      Rate and Rhythm: Normal rate and regular rhythm.      Heart sounds: Murmur heard.     No friction rub. No gallop.      Comments: Grade 2 murmur noted, patient states this is chronic    1+ pitting edema bilaterally  Pulmonary:      Effort: Pulmonary effort is normal.      Breath sounds: Normal breath sounds. No wheezing or rhonchi.   Skin:     General: Skin is warm and dry.   Neurological:      Mental Status: He is alert and oriented to person, place, and time.      Cranial Nerves: No cranial nerve deficit.   Psychiatric:         Mood and Affect: Mood and affect normal.         Behavior: Behavior normal.         Thought Content: Thought content normal.         Judgment: Judgment normal.          Result Review        No Images in the past 120 days found..     The above data has been reviewed by ADOLFO Perkins 09/26/2022 14:19 EDT.          Patient Care Team:  Kiersten Moya APRN as PCP - General (Nurse Practitioner)  Jessica Augustine MD as Consulting Physician (Urology)           ASSESSMENT & PLAN    Diagnoses and all orders for this visit:    1. Left foot pain (Primary)  -     XR Foot 3+ View Left; Future    2. Cardiac murmur  Assessment & Plan:  Patient states this has been present since he was young man, denies any issues      3. Hyperlipidemia, unspecified hyperlipidemia type  Assessment & Plan:  Very poor control on last labs, patient was restarted on Crestor, we will recheck lipids again in 3 months, continue current medication      4. Type 2 diabetes mellitus with hyperglycemia, without long-term current use of insulin ([facility])  Assessment & Plan:  Diabetes is in good control with diet and exercise measures, last A1c 6.3%.  Continue current lifestyle         Tobacco Use: Medium Risk   • Smoking Tobacco Use: Former Smoker   • Smokeless Tobacco Use: Never Used       Follow Up     Return in 3 months (on  12/26/2022), or if symptoms worsen or fail to improve.        Patient was given instructions and counseling regarding his condition or for health maintenance advice. Please see specific information pulled into the AVS if appropriate.   I have reviewed information obtained and documented by others and I have confirmed the accuracy of this documented note.    Kiersten Moya, APRN

## 2022-09-26 NOTE — ASSESSMENT & PLAN NOTE
Very poor control on last labs, patient was restarted on Crestor, we will recheck lipids again in 3 months, continue current medication

## 2022-09-29 ENCOUNTER — PATIENT ROUNDING (BHMG ONLY) (OUTPATIENT)
Dept: FAMILY MEDICINE CLINIC | Facility: CLINIC | Age: 82
End: 2022-09-29

## 2022-09-29 NOTE — PROGRESS NOTES
A [employer] message has been sent to the patient for PATIENT ROUNDING with [facility].

## 2022-10-19 ENCOUNTER — TELEPHONE (OUTPATIENT)
Dept: ORTHOPEDIC SURGERY | Facility: CLINIC | Age: 82
End: 2022-10-19

## 2022-10-19 NOTE — TELEPHONE ENCOUNTER
Caller: MR ELDER     Relationship to patient: SELF     Best call back number: [phone]    Chief complaint: HAND PAIN, RIGHT HAND     Type of visit: INJ     Requested date: N/A      If rescheduling, when is the original appointment:  N/A      Additional notes: PATIENT CALLED IN WANTING TO GET SCHED FOR NEXT INJ FOR RT HAND FOR HIS ARTHRITIS. PATIENT SAID IF NOT REACHED ON THE ABOVE PHONE NUMBER TO CALL HIS WIFES PHONE [phone].

## 2022-10-21 ENCOUNTER — OFFICE VISIT (OUTPATIENT)
Dept: ORTHOPEDIC SURGERY | Facility: CLINIC | Age: 82
End: 2022-10-21

## 2022-10-21 VITALS — WEIGHT: 221 LBS | HEIGHT: 72 IN | OXYGEN SATURATION: 95 % | HEART RATE: 71 BPM | BODY MASS INDEX: 29.93 KG/M2

## 2022-10-21 DIAGNOSIS — M65.341 TRIGGER RING FINGER OF RIGHT HAND: ICD-10-CM

## 2022-10-21 DIAGNOSIS — M65.321 TRIGGER INDEX FINGER OF RIGHT HAND: ICD-10-CM

## 2022-10-21 DIAGNOSIS — M65.311 TRIGGER FINGER OF RIGHT THUMB: Primary | ICD-10-CM

## 2022-10-21 PROCEDURE — 20600 DRAIN/INJ JOINT/BURSA W/O US: CPT | Performed by: ORTHOPAEDIC SURGERY

## 2022-10-21 RX ORDER — LIDOCAINE HYDROCHLORIDE 10 MG/ML
1 INJECTION, SOLUTION INFILTRATION; PERINEURAL
Status: COMPLETED | OUTPATIENT
Start: 2022-10-21 | End: 2022-10-21

## 2022-10-21 RX ORDER — TRIAMCINOLONE ACETONIDE 40 MG/ML
40 INJECTION, SUSPENSION INTRA-ARTICULAR; INTRAMUSCULAR
Status: COMPLETED | OUTPATIENT
Start: 2022-10-21 | End: 2022-10-21

## 2022-10-21 RX ADMIN — LIDOCAINE HYDROCHLORIDE 1 ML: 10 INJECTION, SOLUTION INFILTRATION; PERINEURAL at 10:45

## 2022-10-21 RX ADMIN — TRIAMCINOLONE ACETONIDE 40 MG: 40 INJECTION, SUSPENSION INTRA-ARTICULAR; INTRAMUSCULAR at 10:45

## 2022-10-21 NOTE — PROGRESS NOTES
"Chief Complaint  Follow-up of the Right Hand     Subjective      Lupillo Toledo presents to [facility] for follow up evaluation of the right hand. The patient has been treating his trigger fingers conservatively. He has trigger fingers of his right thumb, index and ring finger. He is requesting repeat injections today.     No Known Allergies     Social History     Socioeconomic History   • Marital status:    Tobacco Use   • Smoking status: Former     Packs/day: 1.00     Years: 34.00     Pack years: 34.00     Types: Cigarettes     Quit date: 1967     Years since quittin.4   • Smokeless tobacco: Never   • Tobacco comments:     STARTED AT AGE 21 AND STOPPED AT AGE 34   Vaping Use   • Vaping Use: Never used   Substance and Sexual Activity   • Alcohol use: Not Currently   • Drug use: Not Currently   • Sexual activity: Defer        Review of Systems     Objective   Vital Signs:   Pulse 71   Ht 182.9 cm (72\")   Wt 100 kg (221 lb)   SpO2 95%   BMI 29.97 kg/m²       Physical Exam  Constitutional:       Appearance: Normal appearance. The patient is well-developed and normal weight.   HENT:      Head: Normocephalic.      Right Ear: Hearing and external ear normal.      Left Ear: Hearing and external ear normal.      Nose: Nose normal.   Eyes:      Conjunctiva/sclera: Conjunctivae normal.   Cardiovascular:      Rate and Rhythm: Normal rate.   Pulmonary:      Effort: Pulmonary effort is normal.      Breath sounds: No wheezing or rales.   Abdominal:      Palpations: Abdomen is soft.      Tenderness: There is no abdominal tenderness.   Musculoskeletal:      Cervical back: Normal range of motion.   Skin:     Findings: No rash.   Neurological:      Mental Status: The patient is alert and oriented to person, place, and time.   Psychiatric:         Mood and Affect: Mood and affect normal.         Judgment: Judgment normal.       Ortho Exam      Right hand- Triggering and locking of " the thumb, index, and ring fingers on the right hand. Tender over the A-1 pulley of the mentioned fingers. Neurovascularly intact. Sensation to light touch median, radial, ulnar nerve. Positive AIN, PIN, ulnar nerve. Intact motor function. Positive pulses.     Right thumb: R thumb CMC  Consent given by: patient  Site marked: site marked  Timeout: Immediately prior to procedure a time out was called to verify the correct patient, procedure, equipment, support staff and site/side marked as required   Supporting Documentation  Indications: pain   Procedure Details  Location: thumb - R thumb CMC  Needle gauge: 23 G.  Medications administered: 1 mL lidocaine 1 %; 40 mg triamcinolone acetonide 40 MG/ML  Patient tolerance: patient tolerated the procedure well with no immediate complications    Right Index Finger  Site marked: site marked  Timeout: Immediately prior to procedure a time out was called to verify the correct patient, procedure, equipment, support staff and site/side marked as required   Supporting Documentation  Indications: pain   Procedure Details  Location: index finger -   Needle gauge: 23 G.  Medications administered: 1 mL lidocaine 1 %; 40 mg triamcinolone acetonide 40 MG/ML  Patient tolerance: patient tolerated the procedure well with no immediate complications    Right Ring Finger  Consent given by: patient  Site marked: site marked  Timeout: Immediately prior to procedure a time out was called to verify the correct patient, procedure, equipment, support staff and site/side marked as required   Supporting Documentation  Indications: pain   Procedure Details  Location: ring finger -   Needle gauge: 23 G.  Medications administered: 1 mL lidocaine 1 %; 40 mg triamcinolone acetonide 40 MG/ML  Patient tolerance: patient tolerated the procedure well with no immediate complications          Imaging Results (Most Recent)     None           Result Review :       XR Foot 3+ View Left    Result Date:  9/26/2022  Narrative: PROCEDURE: XR FOOT 3+ VW LEFT  COMPARISON: None  INDICATIONS: left forefoot pain and swelling, pain in 4th and 5th digit, s/p injury 1 week ago  FINDINGS:  There is generalized osteopenia.  There is marked joint space narrowing at the 1st MTP joint.  There is moderate joint space narrowing at the interphalangeal joints.  No fractures or subluxation are identified.  There are extensive small vessel calcifications present.      Impression:   1. Osteopenia 2. 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 3. No acute findings      Vipul Viramontes MD       Electronically Signed and Approved By: Vipul Viramontes MD on 9/26/2022 at 15:12                      Assessment and Plan     Diagnoses and all orders for this visit:    1. Trigger finger of right thumb (Primary)    2. Trigger index finger of right hand    3. Trigger ring finger of right hand        Discussed the treatment plan with the patient.  Discussed the risks and benefits of a right trigger thumb, index and ring fingers injections today. The patient expressed understanding and wished to proceed. He tolerated the injections well.     Call or return if worsening symptoms.    Follow Up     PRN      Patient was given instructions and counseling regarding his condition or for health maintenance advice. Please see specific information pulled into the AVS if appropriate.     Scribed for Vipul Cox MD by Jacqueline Lucio.  10/21/22   10:24 EDT    I have personally performed the services described in this document as scribed by the above individual and it is both accurate and complete. Vipul Cox MD 10/21/22

## 2022-12-15 ENCOUNTER — OFFICE VISIT (OUTPATIENT)
Dept: FAMILY MEDICINE CLINIC | Facility: CLINIC | Age: 82
End: 2022-12-15

## 2022-12-15 VITALS
BODY MASS INDEX: 28.94 KG/M2 | WEIGHT: 213.4 LBS | DIASTOLIC BLOOD PRESSURE: 74 MMHG | OXYGEN SATURATION: 96 % | HEART RATE: 65 BPM | SYSTOLIC BLOOD PRESSURE: 150 MMHG

## 2022-12-15 DIAGNOSIS — E11.65 TYPE 2 DIABETES MELLITUS WITH HYPERGLYCEMIA, WITHOUT LONG-TERM CURRENT USE OF INSULIN: ICD-10-CM

## 2022-12-15 DIAGNOSIS — R03.0 ELEVATED BLOOD PRESSURE READING: ICD-10-CM

## 2022-12-15 DIAGNOSIS — Z23 NEED FOR PNEUMOCOCCAL VACCINATION: Primary | ICD-10-CM

## 2022-12-15 PROCEDURE — 90677 PCV20 VACCINE IM: CPT | Performed by: NURSE PRACTITIONER

## 2022-12-15 PROCEDURE — 90471 IMMUNIZATION ADMIN: CPT | Performed by: NURSE PRACTITIONER

## 2022-12-15 PROCEDURE — 99213 OFFICE O/P EST LOW 20 MIN: CPT | Performed by: NURSE PRACTITIONER

## 2022-12-15 NOTE — PROGRESS NOTES
Chief Complaint  Follow-up (3 month follow up)    SUBJECTIVE  Lupillo Toledo presents to [facility]    History of Present Illness   Pt presents today for 3 month follow up on left side foot and toe pain.    Pt states he is no longer having the left foot pain.     Pt states no issues or concerns to discuss.    Pt states he doesn't monitor his bs regularly, requesting refill on test strips.    Ortho-Cox-injections    Pt would like to discuss getting pneumonia vaccine    Labs 6/1/22     A1C 6.3      Past Medical History:   Diagnosis Date   • Allergic rhinitis due to allergen 01/12/2021   • Arthritis    • Bladder disorder    • Bowel disease    • BPH (benign prostatic hyperplasia)    • Cataract    • Cervicalgia    • Chronic allergic rhinitis    • Colon polyps    • Diabetes (HCC)    • Difficulty swallowing    • Essential hypertension 01/12/2021   • Gall stones    • Hepatitis    • High cholesterol    • Hyperlipemia    • Hypertension, essential    • Kidney stones    • Limb swelling    • Neuralgia    • Primary osteoarthritis of one hip, right 08/23/2017   • Seasonal allergies    • Trochanteric bursitis of right hip 08/18/2017      Family History   Problem Relation Age of Onset   • Hypertension Father    • Bleeding Disorder Mother    • Other Mother         BLADDER CALCULUS   • Nephrolithiasis Mother    • Bleeding Disorder Sister    • Bleeding Disorder Brother    • Other Other         SPINE PROBLEMS      Past Surgical History:   Procedure Laterality Date   • ANKLE SURGERY     • BACK SURGERY     • CATARACT EXTRACTION     • CERVICAL DISC SURGERY     • COLONOSCOPY     • ENDOSCOPY     • HERNIA REPAIR     • LUMBAR DISC SURGERY     • OTHER SURGICAL HISTORY      ARTIFICAL JOINTS/LIMBS        Current Outpatient Medications:   •  glucose blood (OneTouch Ultra) test strip, 1 each by Other route As Needed (Check BS QD). Use as instructed, Disp: 90 each, Rfl: 3  •  Lancets (onetouch  "ultrasoft) lancets, 1 each by Other route As Needed (Check BS QD). Use as instructed, Disp: 90 each, Rfl: 3  •  tamsulosin (FLOMAX) 0.4 MG capsule 24 hr capsule, TAKE 1 CAPSULE(0.4 MG) BY MOUTH EVERY DAY 30 MINUTES AFTER THE SAME MEAL, Disp: 30 capsule, Rfl: 11  •  fexofenadine (Allegra Allergy) 180 MG tablet, Take 1 tablet by mouth Daily., Disp: 90 tablet, Rfl: 1  •  mupirocin (Bactroban) 2 % cream, Apply 1 application topically to the appropriate area as directed 3 (Three) Times a Day., Disp: 30 g, Rfl: 0    OBJECTIVE  Vital Signs:   /74 (BP Location: Left arm)   Pulse 65   Wt 96.8 kg (213 lb 6.4 oz)   SpO2 96%   BMI 28.94 kg/m²    Estimated body mass index is 28.94 kg/m² as calculated from the following:    Height as of 10/21/22: 182.9 cm (72\").    Weight as of this encounter: 96.8 kg (213 lb 6.4 oz).     Wt Readings from Last 3 Encounters:   12/15/22 96.8 kg (213 lb 6.4 oz)   10/21/22 100 kg (221 lb)   09/26/22 97.5 kg (215 lb)     BP Readings from Last 3 Encounters:   12/15/22 150/74   09/26/22 131/71   05/27/22 146/79       Physical Exam  Vitals reviewed.   Constitutional:       Appearance: Normal appearance. He is well-developed.   HENT:      Head: Normocephalic and atraumatic.      Right Ear: External ear normal.      Left Ear: External ear normal.   Eyes:      Conjunctiva/sclera: Conjunctivae normal.      Pupils: Pupils are equal, round, and reactive to light.   Cardiovascular:      Rate and Rhythm: Normal rate and regular rhythm.      Heart sounds: No murmur heard.    No friction rub. No gallop.   Pulmonary:      Effort: Pulmonary effort is normal.      Breath sounds: Normal breath sounds. No wheezing or rhonchi.   Skin:     General: Skin is warm and dry.   Neurological:      Mental Status: He is alert and oriented to person, place, and time.      Cranial Nerves: No cranial nerve deficit.   Psychiatric:         Mood and Affect: Mood and affect normal.         Behavior: Behavior normal.         " Thought Content: Thought content normal.         Judgment: Judgment normal.          Result Review    CMP    CMP 6/1/22   Glucose 119 (A)   BUN 14   Creatinine 0.83   Sodium 138   Potassium 4.2   Chloride 104   Calcium 9.1   Albumin 4.40   Total Bilirubin 0.8   Alkaline Phosphatase 63   AST (SGOT) 13   ALT (SGPT) 12   (A) Abnormal value            CBC    CBC 6/1/22   WBC 6.42   RBC 5.23   Hemoglobin 15.6   Hematocrit 46.2   MCV 88.3   MCH 29.8   MCHC 33.8   RDW 12.4   Platelets 178           Lipid Panel    Lipid Panel 6/1/22   Total Cholesterol 250 (A)   Triglycerides 128   HDL Cholesterol 47   VLDL Cholesterol 23   LDL Cholesterol  180 (A)   LDL/HDL Ratio 3.77   (A) Abnormal value            TSH    TSH 6/1/22   TSH 2.400           Most Recent A1C    HGBA1C Most Recent 6/1/22   Hemoglobin A1C 6.30 (A)   (A) Abnormal value              XR Foot 3+ View Left    Result Date: 9/26/2022    1. Osteopenia 2. 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 3. No acute findings      Vipul Viramontes MD       Electronically Signed and Approved By: Vipul Viramontes MD on 9/26/2022 at 15:12                 The above data has been reviewed by ADOLFO Perkins 12/15/2022 07:22 EST.          Patient Care Team:  Kiersten Moya APRN as PCP - General (Nurse Practitioner)  Jessica Augustine MD as Consulting Physician (Urology)        ASSESSMENT & PLAN    Diagnoses and all orders for this visit:    1. Need for pneumococcal vaccination (Primary)  -     Pneumococcal Conjugate Vaccine 20-Valent (PCV20)    2. Type 2 diabetes mellitus with hyperglycemia, without long-term current use of insulin (HCC)  -     Discontinue: glucose blood (OneTouch Ultra) test strip; 1 each by Other route As Needed (Check BS QD). Use as instructed  Dispense: 90 each; Refill: 3  -     Hemoglobin A1c; Future  -     glucose blood (OneTouch Ultra) test strip; 1 each by Other route As Needed (Check BS QD). Use as instructed  Dispense:  90 each; Refill: 3    3. Elevated blood pressure reading  Assessment & Plan:  Blood pressure elevated today, well controlled at previous visit, history of hypertension but no longer on medications, we will continue to monitor and recheck again in 6 months         Tobacco Use: Medium Risk   • Smoking Tobacco Use: Former   • Smokeless Tobacco Use: Never   • Passive Exposure: Not on file       Follow Up     Return in about 6 months (around 6/15/2023).      Patient was given instructions and counseling regarding his condition or for health maintenance advice. Please see specific information pulled into the AVS if appropriate.   I have reviewed information obtained and documented by others and I have confirmed the accuracy of this documented note.    ADOLFO Perkins

## 2022-12-15 NOTE — ASSESSMENT & PLAN NOTE
Blood pressure elevated today, well controlled at previous visit, history of hypertension but no longer on medications, we will continue to monitor and recheck again in 6 months

## 2023-01-16 ENCOUNTER — TELEPHONE (OUTPATIENT)
Dept: FAMILY MEDICINE CLINIC | Facility: CLINIC | Age: 83
End: 2023-01-16
Payer: COMMERCIAL

## 2023-01-16 NOTE — TELEPHONE ENCOUNTER
"Received fax from [facility] requesting refill on Metformin. Per office note on 09/26/22 \"patient is diabetic, states he is diet controlled, was previously on metformin but was able to discontinue this, states otherwise only allergies and cholesterol issues\"    Refill not sent   "

## 2023-01-27 ENCOUNTER — LAB (OUTPATIENT)
Dept: LAB | Facility: HOSPITAL | Age: 83
End: 2023-01-27
Payer: COMMERCIAL

## 2023-01-27 DIAGNOSIS — E11.65 TYPE 2 DIABETES MELLITUS WITH HYPERGLYCEMIA, WITHOUT LONG-TERM CURRENT USE OF INSULIN: ICD-10-CM

## 2023-01-27 LAB — HBA1C MFR BLD: 6.8 % (ref 4.8–5.6)

## 2023-01-27 PROCEDURE — 83036 HEMOGLOBIN GLYCOSYLATED A1C: CPT

## 2023-01-27 PROCEDURE — 36415 COLL VENOUS BLD VENIPUNCTURE: CPT

## 2023-04-03 DIAGNOSIS — R97.20 ELEVATED PROSTATE SPECIFIC ANTIGEN (PSA): ICD-10-CM

## 2023-04-03 RX ORDER — TAMSULOSIN HYDROCHLORIDE 0.4 MG/1
1 CAPSULE ORAL DAILY
Qty: 90 CAPSULE | Refills: 0 | Status: SHIPPED | OUTPATIENT
Start: 2023-04-03

## 2023-06-13 DIAGNOSIS — R97.20 ELEVATED PSA: Primary | ICD-10-CM

## 2023-06-29 NOTE — H&P
"   History and Physical      Patient Name: Lupillo Toledo   Patient ID: [id number]   Sex: Male   YOB: 1940    Primary Care Provider: David Delarosa PA-C   Referring Provider: David Delarosa PA-C    Visit Date: January 21, 2021    Provider: ADOLFO Fraser   Location: [facility]   Location Address: [address] [place] [address]  [id number]   Location Phone: [phone]          Chief Complaint  · Elevated PSA  · \"My doctor sent me because my blood test is high\"      History Of Present Illness  The patient is a 80 year old /White male, who presents on referral from David Delarosa PA-C, for an urological evaluation for an elevated PSA. The PSA was noted as elevated on 12/11/2020 and stated to be mildly elevated and at a level of 5.31 . There has not been a repeat PSA since the last elevated one on 01/11/2021   The patient also reports nocturia, 1-2 times a night. Additionally, he denies burning, chills, fever, frequency, hesitancy, incomplete emptying, previous UTI's, and slowing of his stream.   The patient is currently not taking any Urology specific medications.   Petinent studies:  To date, no diagnostic studies have been performed. He has not had any recent care for this condition.      Denies any urinary frequency urgency or dysuria.    Admits to a good urinary stream.  Denies any urinary spitting.    Admits to having gross hematuria several x2 weeks ago.    Denies any scrotum or penis pain.  Denies any ED issues.    Admits to right flank pain.    Admits history of kidney stones.    Admits to nocturia 1-2 times a night.    Previous psa's:  1/21: 5.31  7/10: 3.6  7/08: 5.3  4/06: 3.0             Past Medical History  Disease Name Date Onset Notes   Allergic rhinitis --  --    Allergic rhinitis due to allergen 01/12/2021 --    Allergic rhinitis, chronic --  --    Arthritis --  --    Arthritis --  --    Bowel Disease --  --    Cataract --  --    Cervicalgia --  --    Colon " Patient contacted. Accepted below appointment and [facility].     I faxed Colonoscopy with path report to PCP Dr. Anant Christianson Fax # [phone]    Your Appointments      Tuesday July 11, 2023  4:30 PM  Follow Up Visit with Renetta Stewart MD  [address], [address] [facility],[address], [facility] [address] [phone] Polyps --  --    Diabetes --  --    Difficulty swallowing --  --    Essential hypertension 01/12/2021 --    Gall Stones --  --    Hepatitis --  --    High cholesterol --  --    Hyperlipemia --  --    Hypertension, essential --  --    Kidney stones --  --    Limb Swelling --  --    Neuralgia --  --    Primary osteoarthritis of hip, right 08/23/2017 --    Right Hip Trochanteric Bursitis 08/18/2017 --    Seasonal allergies --  --          Past Surgical History  Procedure Name Date Notes   Artificial Joints/Limbs --  --    Back --  --    Cataract surgery --  --    Cervical Disk Surgery --  --    Colonoscopy --  --    EGD --  --    Hernia --  --    Hernia Repair --  --    Lumbar Disk Surgery --  --          Medication List  Name Date Started Instructions   Allegra Allergy 60 mg oral tablet  take 1 tablet (60 mg) by oral route 2 times per day   Aspir-81 81 mg oral tablet,delayed release (DR/EC)  take 1 tablet (81 mg) by oral route once daily   gabapentin 400 mg oral capsule  take 1 capsule by oral route   nystatin 100,000 unit/gram topical cream 01/11/2021 apply to the affected area(s) by topical route 2 times per day   triamterene-hydrochlorothiazid 37.5-25 mg oral tablet  take 1 tablet by oral route once daily         Allergy List  Allergen Name Date Reaction Notes   NO KNOWN DRUG ALLERGIES --  --  --          Family Medical History  Disease Name Relative/Age Notes   Hypertension Father/   --    Spine Problems  --    Renal Calculus Mother/   Mother   -Mother's Family History Unknown Mother/   Mother   Bladder calculus Mother/   Mother   *No Known Family History  --    Blood Diseases Brother/  Mother/  Sister/   Mother; Sister; Brother         Social History  Finding Status Start/Stop Quantity Notes   Alcohol Never --/-- --  drinks no   Alcohol Use Never --/-- --  does not drink   Caffeine Current some day --/-- --  drinks occasionally; coffee and tea; 1-2 times per day   lives with spouse --  --/-- --  --     --   --/-- --  --    . --  --/-- --  --    No known infection risk --  --/-- --  --    Recreational Drug Use Never --/-- --  no   Second hand smoke exposure Never --/-- --  no   Tobacco Former --/-- --  former smoker  former smoker; started smoking at age 21; quit smoking at age 34  former smoker   Working --  --/-- --  --          Review of Systems  · Constitutional  o Denies  o : fever, chills  · HENT  o Denies  o : sore throat, nasal congestion, nasal discharge, sinus pain, headaches  · Cardiovascular  o Denies  o : chest pain, cardiac murmurs, irregular heart beats, dyspnea on exertion  · Respiratory  o Denies  o : shortness of breath, wheezing  · Gastrointestinal  o Denies  o : nausea, vomiting, change in abdominal girth, diarrhea, constipation, blood in stools  · Genitourinary  o Admits  o : nocturia, hematuria  o Denies  o : urgency, frequency, dysuria, pyuria, oliguria, change in urine color, incontinence, urinary retention, difficulty voiding, urinary hesitancy, decreased stream, additional symptoms, except as noted in HPI  · Integument  o Denies  o : rash, itching, new skin lesions  · Neurologic  o Denies  o : tingling or numbness, incoordination, seizures  · Endocrine  o Denies  o : polyuria, polydipsia, cold intolerance, heat intolerance, weight gain, weight loss  · Psychiatric  o Denies  o : anxiety, depression      Vitals  Date Time BP Position Site L\R Cuff Size HR RR TEMP (F) WT  HT  BMI kg/m2 BSA m2 O2 Sat FR L/min FiO2        01/21/2021 11:18 AM       14  220lbs 0oz 6'   29.84 2.25             Physical Examination  · Constitutional  o Appearance  o : Well nourished, well developed patient in no acute distress. Ambulating without difficulty.  · Neck  o Thyroid  o : Normal size without tenderness, nodules or masses  · Respiratory  o Respiratory Effort  o : Breathing is unlabored without accessory muscle use  o Auscultation of Lungs  o : Normal breath sounds  · Gastrointestinal  o Abdominal  Examination  o : Scaphoid abdomen which is non-tender to palpation with normal tone and without rigidity or guarding. Normal bowel sounds. No masses present.  o Liver and spleen  o : No hepatomegaly present. Liver is non-tender to palpation and spleen is not palpable.  o Hernias  o : No abdominal wall hernias are present.  · Genitourinary  o Penis  o : Normal appearance without lesion, discharge or masses.  · Lymphatic  o Neck  o : No lymphadenopathy present  o Axilla  o : No lymphadenopathy present  o Groin  o : No lymphadenopathy present  · Skin and Subcutaneous Tissue  o General Inspection  o : No rashes, lesions or areas of discoloration present. Skin turgor is normal.  o General Palpation  o : No abnormalities, masses or tenderness on palpation.          Results  · In-Office Procedures  o Lab procedure  § Automated Dipstick Urinalysis (Surg Spec) WITHOUT Micro [facility] ([id number])   § Color Ur: Yellow   § Clarity Ur: Clear   § Glucose Ur Ql Strip: Negative   § Bilirub Ur Ql Strip: Negative   § Ketones Ur Ql Strip: Negative   § Sp Gr Ur Qn: greater than 1.030   § Hgb Ur Ql Strip: Trace-Intact   § pH Ur-LsCnc: 5.5   § Prot Ur Ql Strip: Trace   § Urobilinogen Ur Strip-mCnc: 0.2 E.U./dL   § Nitrite Ur Ql Strip: Negative   § WBC Est Ur Ql Strip: Negative       Assessment  · Elevated prostate specific antigen (PSA)     790.93/R97.20  · Nocturia     788.43/R35.1  · Gross hematuria     599.71/R31.0  · History of kidney stones     V13.01/Z87.442    Problems Reconciled  Plan  · Orders  o Urinalysis with Reflex Microscopy if abnormal ([facility]) ([id number]) - 790.93/R97.20, 788.43/R35.1, 599.71/R31.0, V13.01/Z87.442 - 01/21/2021  o Urine Culture (Clean Catch) [facility] ([id number]) - 790.93/R97.20, 788.43/R35.1, 599.71/R31.0, V13.01/Z87.442 - 01/21/2021  o CT Abdomen and Pelvis without and with Contrast UROLOGY PROTOCOL (includes delayed imaging) [facility] ([id number]) - 790.93/R97.20, 788.43/R35.1, 599.71/R31.0, V13.01/Z87.442 -  01/22/2021  · Medications  o Medications have been Reconciled  o Transition of Care or Provider Policy  · Instructions  o DISCUSSION AND PLAN: I have discussed with the patient that there is no PSA level that can indicate he has prostate cancer. The only way to confirm is with a prostate biopsy.  o The patient's PSA is elevated on initial exam. I have discussed the causes of an elevated PSA. I have made recommendations and I have the patient return in follow-up for a recheck of the PSA.  o Being patient is with gross hematuria we will go ahead and get a CT scan. I will have the patient follow-up post imaging.  o Electronically Identified Patient Education Materials Provided Electronically            Electronically Signed by: ADOLFO Fraser -Author on January 22, 2021 01:01:31 PM

## 2023-07-31 ENCOUNTER — TELEPHONE (OUTPATIENT)
Dept: ORTHOPEDIC SURGERY | Facility: CLINIC | Age: 83
End: 2023-07-31
Payer: COMMERCIAL

## 2023-07-31 DIAGNOSIS — H72.90 PERFORATION OF TYMPANIC MEMBRANE, UNSPECIFIED LATERALITY: Primary | ICD-10-CM

## 2023-08-07 ENCOUNTER — APPOINTMENT (OUTPATIENT)
Dept: GENERAL RADIOLOGY | Facility: HOSPITAL | Age: 83
End: 2023-08-07
Payer: COMMERCIAL

## 2023-08-07 ENCOUNTER — TELEPHONE (OUTPATIENT)
Dept: FAMILY MEDICINE CLINIC | Facility: CLINIC | Age: 83
End: 2023-08-07
Payer: COMMERCIAL

## 2023-08-07 ENCOUNTER — HOSPITAL ENCOUNTER (EMERGENCY)
Facility: HOSPITAL | Age: 83
Discharge: HOME | End: 2023-08-07
Attending: EMERGENCY MEDICINE | Admitting: EMERGENCY MEDICINE
Payer: COMMERCIAL

## 2023-08-07 VITALS
HEIGHT: 72 IN | SYSTOLIC BLOOD PRESSURE: 121 MMHG | TEMPERATURE: 98.7 F | HEART RATE: 69 BPM | WEIGHT: 216.49 LBS | BODY MASS INDEX: 29.32 KG/M2 | OXYGEN SATURATION: 95 % | RESPIRATION RATE: 18 BRPM | DIASTOLIC BLOOD PRESSURE: 98 MMHG

## 2023-08-07 DIAGNOSIS — R42 VERTIGO: Primary | ICD-10-CM

## 2023-08-07 LAB
ALBUMIN SERPL-MCNC: 4.4 G/DL (ref 3.5–5.2)
ALBUMIN/GLOB SERPL: 1.8 G/DL
ALP SERPL-CCNC: 75 U/L (ref 39–117)
ALT SERPL W P-5'-P-CCNC: 17 U/L (ref 1–41)
ANION GAP SERPL CALCULATED.3IONS-SCNC: 11.5 MMOL/L (ref 5–15)
AST SERPL-CCNC: 13 U/L (ref 1–40)
BASOPHILS # BLD AUTO: 0.03 10*3/MM3 (ref 0–0.2)
BASOPHILS NFR BLD AUTO: 0.5 % (ref 0–1.5)
BILIRUB SERPL-MCNC: 0.5 MG/DL (ref 0–1.2)
BILIRUB UR QL STRIP: NEGATIVE
BUN SERPL-MCNC: 16 MG/DL (ref 8–23)
BUN/CREAT SERPL: 14.8 (ref 7–25)
CALCIUM SPEC-SCNC: 9.2 MG/DL (ref 8.6–10.5)
CHLORIDE SERPL-SCNC: 103 MMOL/L (ref 98–107)
CLARITY UR: CLEAR
CO2 SERPL-SCNC: 22.5 MMOL/L (ref 22–29)
COLOR UR: YELLOW
CREAT SERPL-MCNC: 1.08 MG/DL (ref 0.76–1.27)
DEPRECATED RDW RBC AUTO: 39.4 FL (ref 37–54)
EGFRCR SERPLBLD CKD-EPI 2021: 68.5 ML/MIN/1.73
EOSINOPHIL # BLD AUTO: 0.2 10*3/MM3 (ref 0–0.4)
EOSINOPHIL NFR BLD AUTO: 3.1 % (ref 0.3–6.2)
ERYTHROCYTE [DISTWIDTH] IN BLOOD BY AUTOMATED COUNT: 12.7 % (ref 12.3–15.4)
GLOBULIN UR ELPH-MCNC: 2.5 GM/DL
GLUCOSE BLDC GLUCOMTR-MCNC: 245 MG/DL (ref 70–99)
GLUCOSE SERPL-MCNC: 250 MG/DL (ref 65–99)
GLUCOSE UR STRIP-MCNC: ABNORMAL MG/DL
HCT VFR BLD AUTO: 46.8 % (ref 37.5–51)
HGB BLD-MCNC: 16.3 G/DL (ref 13–17.7)
HGB UR QL STRIP.AUTO: NEGATIVE
HOLD SPECIMEN: NORMAL
HOLD SPECIMEN: NORMAL
IMM GRANULOCYTES # BLD AUTO: 0.02 10*3/MM3 (ref 0–0.05)
IMM GRANULOCYTES NFR BLD AUTO: 0.3 % (ref 0–0.5)
KETONES UR QL STRIP: NEGATIVE
LEUKOCYTE ESTERASE UR QL STRIP.AUTO: NEGATIVE
LYMPHOCYTES # BLD AUTO: 2.15 10*3/MM3 (ref 0.7–3.1)
LYMPHOCYTES NFR BLD AUTO: 32.9 % (ref 19.6–45.3)
MAGNESIUM SERPL-MCNC: 2 MG/DL (ref 1.6–2.4)
MCH RBC QN AUTO: 29.7 PG (ref 26.6–33)
MCHC RBC AUTO-ENTMCNC: 34.8 G/DL (ref 31.5–35.7)
MCV RBC AUTO: 85.2 FL (ref 79–97)
MONOCYTES # BLD AUTO: 0.53 10*3/MM3 (ref 0.1–0.9)
MONOCYTES NFR BLD AUTO: 8.1 % (ref 5–12)
NEUTROPHILS NFR BLD AUTO: 3.61 10*3/MM3 (ref 1.7–7)
NEUTROPHILS NFR BLD AUTO: 55.1 % (ref 42.7–76)
NITRITE UR QL STRIP: NEGATIVE
NRBC BLD AUTO-RTO: 0 /100 WBC (ref 0–0.2)
PH UR STRIP.AUTO: 6.5 [PH] (ref 5–8)
PLATELET # BLD AUTO: 178 10*3/MM3 (ref 140–450)
PMV BLD AUTO: 9.3 FL (ref 6–12)
POTASSIUM SERPL-SCNC: 3.8 MMOL/L (ref 3.5–5.2)
PROT SERPL-MCNC: 6.9 G/DL (ref 6–8.5)
PROT UR QL STRIP: NEGATIVE
QT INTERVAL: 404 MS
RBC # BLD AUTO: 5.49 10*6/MM3 (ref 4.14–5.8)
SODIUM SERPL-SCNC: 137 MMOL/L (ref 136–145)
SP GR UR STRIP: 1.02 (ref 1–1.03)
TROPONIN T SERPL HS-MCNC: 18 NG/L
TROPONIN T SERPL HS-MCNC: 20 NG/L
UROBILINOGEN UR QL STRIP: ABNORMAL
WBC NRBC COR # BLD: 6.54 10*3/MM3 (ref 3.4–10.8)
WHOLE BLOOD HOLD COAG: NORMAL
WHOLE BLOOD HOLD SPECIMEN: NORMAL

## 2023-08-07 PROCEDURE — 80053 COMPREHEN METABOLIC PANEL: CPT

## 2023-08-07 PROCEDURE — 93005 ELECTROCARDIOGRAM TRACING: CPT | Performed by: EMERGENCY MEDICINE

## 2023-08-07 PROCEDURE — 85025 COMPLETE CBC W/AUTO DIFF WBC: CPT

## 2023-08-07 PROCEDURE — 71045 X-RAY EXAM CHEST 1 VIEW: CPT

## 2023-08-07 PROCEDURE — 81003 URINALYSIS AUTO W/O SCOPE: CPT

## 2023-08-07 PROCEDURE — 84484 ASSAY OF TROPONIN QUANT: CPT | Performed by: PHYSICIAN ASSISTANT

## 2023-08-07 PROCEDURE — 93005 ELECTROCARDIOGRAM TRACING: CPT

## 2023-08-07 PROCEDURE — 84484 ASSAY OF TROPONIN QUANT: CPT

## 2023-08-07 PROCEDURE — 82948 REAGENT STRIP/BLOOD GLUCOSE: CPT

## 2023-08-07 PROCEDURE — 83735 ASSAY OF MAGNESIUM: CPT

## 2023-08-07 PROCEDURE — 99284 EMERGENCY DEPT VISIT MOD MDM: CPT

## 2023-08-07 PROCEDURE — 36415 COLL VENOUS BLD VENIPUNCTURE: CPT

## 2023-08-07 RX ORDER — MECLIZINE HYDROCHLORIDE 25 MG/1
25 TABLET ORAL 3 TIMES DAILY PRN
Qty: 21 TABLET | Refills: 0 | Status: SHIPPED | OUTPATIENT
Start: 2023-08-07 | End: 2023-08-14 | Stop reason: SDUPTHER

## 2023-08-07 RX ORDER — SODIUM CHLORIDE 0.9 % (FLUSH) 0.9 %
10 SYRINGE (ML) INJECTION AS NEEDED
Status: DISCONTINUED | OUTPATIENT
Start: 2023-08-07 | End: 2023-08-07 | Stop reason: HOSPADM

## 2023-08-07 NOTE — TELEPHONE ENCOUNTER
Caller: JERALD ELDER    Relationship: Emergency Contact    Best call back number: [phone]    What is the best time to reach you: anytime    Who are you requesting to speak with (clinical staff, provider,  specific staff member): paige    Do you know the name of the person who called: jerald    What was the call regarding: patient was having chest pain and blood sugar reading are very high and wife said he was not keeping up with it at all. So I sent him to the main ER to be check out just because     Is it okay if the provider responds through MyChart: no

## 2023-08-07 NOTE — ED PROVIDER NOTES
Time: 3:24 PM EDT  Date of encounter:  8/7/2023  Independent Historian/Clinical History and Information was obtained by:   Patient    History is limited by: N/A    Chief Complaint   Patient presents with    Dizziness    Nausea    Vomiting    Excessive Sweating         History of Present Illness:  Patient is a 83 y.o. year old male who presents to the emergency department for evaluation of nausea, dizziness. The patient reports that he has had dizziness ofr couple years. He had a severe episode on Friday where he felt like he could pass out and laid on the floor. Dizziness resolved. Did have episode of vomiting during this episodes. Dizziness is described as room spinning, intermittent, lasts couple of seconds and resolves. Does have some ringing in the ear. Denies chest pain, palpitations, SOA, cough. (Juan Niño PA-C provider in triage 3:32 PM EDT )     [facility]    Patient Care Team  Primary Care Provider: Kiersten Moya APRN    Past Medical History:     No Known Allergies  Past Medical History:   Diagnosis Date    Allergic rhinitis due to allergen 01/12/2021    Arthritis     Bladder disorder     Bowel disease     BPH (benign prostatic hyperplasia)     Cataract     Cervicalgia     Chronic allergic rhinitis     Colon polyps     Diabetes     Difficulty swallowing     Essential hypertension 01/12/2021    Gall stones     Hepatitis     High cholesterol     Hyperlipemia     Hypertension, essential     Kidney stones     Limb swelling     Neuralgia     Primary osteoarthritis of one hip, right 08/23/2017    Seasonal allergies     Trochanteric bursitis of right hip 08/18/2017     Past Surgical History:   Procedure Laterality Date    ANKLE SURGERY      BACK SURGERY      CATARACT EXTRACTION      CERVICAL DISC SURGERY      COLONOSCOPY      ENDOSCOPY      HERNIA REPAIR      LUMBAR DISC SURGERY      OTHER SURGICAL HISTORY      ARTIFICAL JOINTS/LIMBS     Family History   Problem Relation Age of Onset    Hypertension Father      Bleeding Disorder Mother     Other Mother         BLADDER CALCULUS    Nephrolithiasis Mother     Bleeding Disorder Sister     Bleeding Disorder Brother     Other Other         SPINE PROBLEMS       Home Medications:  Prior to Admission medications    Medication Sig Start Date End Date Taking? Authorizing Provider   fexofenadine (Allegra Allergy) 180 MG tablet Take 1 tablet by mouth Daily. 22   Romel Delarosa PA   glucose blood (OneTouch Ultra) test strip 1 each by Other route As Needed (Check BS QD). Use as instructed 12/15/22   Kiersten Moya APRN   Lancets (onetouch ultrasoft) lancets 1 each by Other route As Needed (Check BS QD). Use as instructed 21   Romel Delarosa PA   mupirocin (Bactroban) 2 % cream Apply 1 application topically to the appropriate area as directed 3 (Three) Times a Day. 22   Romel Delarosa PA   tamsulosin (FLOMAX) 0.4 MG capsule 24 hr capsule Take 1 capsule by mouth Daily. Patient needs to keep yearly follow up appointment with Dr. Augustine for additional refills. 7/3/23   Jessica Augustine MD        Social History:   Social History     Tobacco Use    Smoking status: Former     Packs/day: 1.00     Years: 34.00     Pack years: 34.00     Types: Cigarettes     Quit date: 1967     Years since quittin.2    Smokeless tobacco: Never    Tobacco comments:     STARTED AT AGE 21 AND STOPPED AT AGE 34   Vaping Use    Vaping Use: Never used   Substance Use Topics    Alcohol use: Not Currently    Drug use: Not Currently         Review of Systems:  Review of Systems   Constitutional:  Negative for chills and fever.   HENT:  Negative for congestion and sore throat.    Respiratory:  Negative for cough and shortness of breath.    Cardiovascular:  Negative for chest pain and palpitations.   Gastrointestinal:  Positive for nausea and vomiting (x1). Negative for abdominal pain and diarrhea.   Genitourinary:  Negative for dysuria.   Neurological:  Positive for dizziness. Negative for  "headaches.   All other systems reviewed and are negative.     Physical Exam:  /98   Pulse 69   Temp 98.7 øF (37.1 øC) (Oral)   Resp 18   Ht 182.9 cm (72\")   Wt 98.2 kg (216 lb 7.9 oz)   SpO2 95%   BMI 29.36 kg/mý         Physical Exam  Vitals and nursing note reviewed.   Constitutional:       Appearance: Normal appearance. He is not ill-appearing or toxic-appearing.   HENT:      Head: Normocephalic.      Right Ear: No middle ear effusion. Tympanic membrane is not scarred.      Left Ear:  No middle ear effusion. Tympanic membrane is scarred.      Nose: Nose normal.      Mouth/Throat:      Mouth: Mucous membranes are moist.   Eyes:      Conjunctiva/sclera: Conjunctivae normal.   Cardiovascular:      Rate and Rhythm: Normal rate and regular rhythm.      Heart sounds: Murmur heard.   Pulmonary:      Effort: Pulmonary effort is normal.      Breath sounds: Normal breath sounds.   Abdominal:      Palpations: Abdomen is soft.      Tenderness: There is no abdominal tenderness.   Musculoskeletal:         General: Normal range of motion.      Cervical back: Normal range of motion.      Right lower leg: No edema.      Left lower leg: No edema.   Skin:     General: Skin is warm and dry.      Capillary Refill: Capillary refill takes less than 2 seconds.   Neurological:      Mental Status: He is alert.                    Procedures:  Procedures      Medical Decision Making:      Comorbidities that affect care:    Diabetes, Hypertension    External Notes reviewed:    Previous Clinic Note: family med visit 12/15/22 , requested refill on DM test strips. Reports diet controlled and no longer on metformin.       The following orders were placed and all results were independently analyzed by me:  Orders Placed This Encounter   Procedures    XR Chest 1 View    [place] Draw    Comprehensive Metabolic Panel    Single High Sensitivity Troponin T    Magnesium    Urinalysis With Microscopic If Indicated (No Culture) - Urine, " Clean Catch    CBC Auto Differential    Single High Sensitivity Troponin T    NPO Diet NPO Type: Strict NPO    Undress & Gown    Continuous Pulse Oximetry    Vital Signs    Orthostatic Blood Pressure    Oxygen Therapy- Nasal Cannula; Titrate 1-6 LPM Per SpO2; 90 - 95%    POC Glucose Once    POC Glucose Once    ECG 12 Lead ED Triage Standing Order; Weak / Dizzy / AMS    Insert Peripheral IV    Fall Precautions    CBC & Differential    Green Top (Gel)    Lavender Top    Gold Top - SST    Light Blue Top       Medications Given in the Emergency Department:  Medications   sodium chloride 0.9 % flush 10 mL (has no administration in time range)        ED Course:    The patient was initially evaluated in the triage area where orders were placed. The patient was later dispositioned by Juan Niño PA-C.      The patient was advised to stay for completion of workup which includes but is not limited to communication of labs and radiological results, reassessment and plan. The patient was advised that leaving prior to disposition by a provider could result in critical findings that are not communicated to the patient.          Labs:    Lab Results (last 24 hours)       Procedure Component Value Units Date/Time    CBC & Differential [[id number]]  (Normal) Collected: 08/07/23 1350    Specimen: Blood from Arm, Right Updated: 08/07/23 1415    Narrative:      The following orders were created for panel order CBC & Differential.  Procedure                               Abnormality         Status                     ---------                               -----------         ------                     CBC Auto Differential[[id number]]        Normal              Final result                 Please view results for these tests on the individual orders.    Comprehensive Metabolic Panel [[id number]]  (Abnormal) Collected: 08/07/23 1350    Specimen: Blood from Arm, Right Updated: 08/07/23 1437     Glucose 250 mg/dL      BUN 16 mg/dL       Creatinine 1.08 mg/dL      Sodium 137 mmol/L      Potassium 3.8 mmol/L      Chloride 103 mmol/L      CO2 22.5 mmol/L      Calcium 9.2 mg/dL      Total Protein 6.9 g/dL      Albumin 4.4 g/dL      ALT (SGPT) 17 U/L      AST (SGOT) 13 U/L      Alkaline Phosphatase 75 U/L      Total Bilirubin 0.5 mg/dL      Globulin 2.5 gm/dL      A/G Ratio 1.8 g/dL      BUN/Creatinine Ratio 14.8     Anion Gap 11.5 mmol/L      eGFR 68.5 mL/min/1.73     Narrative:      GFR Normal >60  Chronic Kidney Disease <60  Kidney Failure <15    The GFR formula is only valid for adults with stable renal function between ages 18 and 70.    Single High Sensitivity Troponin T [[id number]]  (Abnormal) Collected: 08/07/23 1350    Specimen: Blood from Arm, Right Updated: 08/07/23 1437     HS Troponin T 20 ng/L     Narrative:      High Sensitive Troponin T Reference Range:  <10.0 ng/L- Negative Female for AMI  <15.0 ng/L- Negative Male for AMI  >=10 - Abnormal Female indicating possible myocardial injury.  >=15 - Abnormal Male indicating possible myocardial injury.   Clinicians would have to utilize clinical acumen, EKG, Troponin, and serial changes to determine if it is an Acute Myocardial Infarction or myocardial injury due to an underlying chronic condition.         Magnesium [[id number]]  (Normal) Collected: 08/07/23 1350    Specimen: Blood from Arm, Right Updated: 08/07/23 1437     Magnesium 2.0 mg/dL     CBC Auto Differential [[id number]]  (Normal) Collected: 08/07/23 1350    Specimen: Blood from Arm, Right Updated: 08/07/23 1415     WBC 6.54 10*3/mm3      RBC 5.49 10*6/mm3      Hemoglobin 16.3 g/dL      Hematocrit 46.8 %      MCV 85.2 fL      MCH 29.7 pg      MCHC 34.8 g/dL      RDW 12.7 %      RDW-SD 39.4 fl      MPV 9.3 fL      Platelets 178 10*3/mm3      Neutrophil % 55.1 %      Lymphocyte % 32.9 %      Monocyte % 8.1 %      Eosinophil % 3.1 %      Basophil % 0.5 %      Immature Grans % 0.3 %      Neutrophils, Absolute 3.61 10*3/mm3       Lymphocytes, Absolute 2.15 10*3/mm3      Monocytes, Absolute 0.53 10*3/mm3      Eosinophils, Absolute 0.20 10*3/mm3      Basophils, Absolute 0.03 10*3/mm3      Immature Grans, Absolute 0.02 10*3/mm3      nRBC 0.0 /100 WBC     Urinalysis With Microscopic If Indicated (No Culture) - Urine, Clean Catch [[id number]]  (Abnormal) Collected: 08/07/23 1405    Specimen: Urine, Clean Catch Updated: 08/07/23 1423     Color, UA Yellow     Appearance, UA Clear     pH, UA 6.5     Specific Gravity, UA 1.019     Glucose,  mg/dL (2+)     Ketones, UA Negative     Bilirubin, UA Negative     Blood, UA Negative     Protein, UA Negative     Leuk Esterase, UA Negative     Nitrite, UA Negative     Urobilinogen, UA 1.0 E.U./dL    Narrative:      Urine microscopic not indicated.    POC Glucose Once [[id number]]  (Abnormal) Collected: 08/07/23 1412    Specimen: Blood Updated: 08/07/23 1413     Glucose 245 mg/dL      Comment: Serial Number: [id number]:  [id number]       Single High Sensitivity Troponin T [[id number]]  (Abnormal) Collected: 08/07/23 1611    Specimen: Blood Updated: 08/07/23 1647     HS Troponin T 18 ng/L     Narrative:      High Sensitive Troponin T Reference Range:  <10.0 ng/L- Negative Female for AMI  <15.0 ng/L- Negative Male for AMI  >=10 - Abnormal Female indicating possible myocardial injury.  >=15 - Abnormal Male indicating possible myocardial injury.   Clinicians would have to utilize clinical acumen, EKG, Troponin, and serial changes to determine if it is an Acute Myocardial Infarction or myocardial injury due to an underlying chronic condition.                  Imaging:    XR Chest 1 View    Result Date: 8/7/2023  PROCEDURE: XR CHEST 1 VW  COMPARISON: [facility], CR, CHEST PA/AP & LAT 2V, 8/14/2006, 8:30.  INDICATIONS: WEAK/DIZZY/AMS TODAY  FINDINGS:   The lungs are well-expanded. The heart and pulmonary vasculature are within normal limits. No pleural effusions are identified. There are no  active appearing infiltrates.  Prior reverse total left shoulder arthroplasty.  IMPRESSION: No active disease.  DARLENE OLIVIA MD       Electronically Signed and Approved By: DARLENE OLIVIA MD on 8/07/2023 at 14:31                Differential Diagnosis and Discussion:      Dizziness: Based on the patient's history, signs, and symptoms, the diffential diagnosis includes but is not limited to meningitis, stroke, sepsis, subarachnoid hemorrhage, intracranial bleeding, encephalitis, vertigo, electrolyte imbalance, and metabolic disorders.    All labs were reviewed and interpreted by me.  All X-rays impressions were independently interpreted by me.  EKG was interpreted by supervising attending.    MDM           Patient Care Considerations:    CT HEAD: I considered ordering a noncontrast CT of the head, however Hx and PE consistent with peripheral vertigo      Consultants/Shared Management Plan:        Social Determinants of Health:    Patient is independent, reliable, and has access to care.       Disposition and Care Coordination:    Discharged: I considered escalation of care by admitting this patient for observation, however the patient has improved and is suitable and  stable for discharge.    Hx and PE consistent with a peripheral vertigo, it is intermittent, episodic, positional. Denies chest pain. Has elevated BS. No evidence of DKA. CBC without leukocytosis, anemia, neutrophilia. CMP without significant electrolyte abnormality, normal renal and hepatic function. Glucose 245. Mg WNL. UA noninfectious. Trop 20, will delta trop. Delta trop 18. EKG without ischemic findings. Has f/u with ENT in 2 weeks. Has PCP appt tomorrow.     The patient is resting comfortably and feels better, is alert, talkative and in no distress. The repeat examination is unremarkable and benign. The patient is neurologically intact, has a normal mental status and this ambulatory in the ED. The history, exam, diagnostic testing in the  patient's current condition do not suggest meningitis, stroke, sepsis, subarachnoid hemorrhage, intracranial bleeding, encephalitis or other significant pathology that would warrant further testing, continued ED treatment, admission, neurological consultation, or other specialist evaluation at this point. The vital signs have been stable. The patient's condition is stable and appropriate for discharge. The patient will pursue further outpatient evaluation with the primary care physician or other designated or consulting position as indicated in the discharge instructions.     I have explained the patient's condition, diagnoses and treatment plan based on the information available to me at this time. I have answered questions and addressed any concerns. The patient has a good  understanding of the patient's diagnosis, condition, and treatment plan as can be expected at this point. The vital signs have been stable. The patient's condition is stable and appropriate for discharge from the emergency department.      The patient will pursue further outpatient evaluation with the primary care physician or other designated or consulting physician as outlined in the discharge instructions. They are agreeable to this plan of care and follow-up instructions have been explained in detail. The patient has received these instructions in written format and have expressed an understanding of the discharge instructions. The patient is aware that any significant change in condition or worsening of symptoms should prompt an immediate return to this or the closest emergency department or call to 911.  I have explained discharge medications and the need for follow up with the patient/caretakers. This was also printed in the discharge instructions. Patient was discharged with the following medications and follow up:      Medication List        New Prescriptions      meclizine 25 MG tablet  Commonly known as: ANTIVERT  Take 1 tablet by  mouth 3 (Three) Times a Day As Needed for Dizziness for up to 7 days.               Where to Get Your Medications        These medications were sent to [id number] - [facility] [place] - [address] AT [facility] [address] - [phone]  - [phone] FX  [address], [facility] [address]      Phone: [phone]   meclizine 25 MG tablet      Kiersten Moya, APRN  [address]  [place] [address]  [phone]      keep appointment for tomorrow    Lynette Padgett, APRN  [address]  [place] [address]  [phone]      keep appointment 8/22, call and move appt up if needed    [facility] EMERGENCY ROOM  [address] [facility] [address] [phone]    If symptoms worsen       Final diagnoses:   Vertigo        ED Disposition       ED Disposition   Discharge    Condition   Stable    Comment   --               This medical record created using voice recognition software.             Juan Niño PA-C  08/07/23 [id number]

## 2023-08-07 NOTE — ED NOTES
Pt reports dizziness for the last few weeks, states his diabetes is diet controlled, BG on arrival to ED room is 245

## 2023-08-08 ENCOUNTER — OFFICE VISIT (OUTPATIENT)
Dept: ORTHOPEDIC SURGERY | Facility: CLINIC | Age: 83
End: 2023-08-08
Payer: COMMERCIAL

## 2023-08-08 ENCOUNTER — OFFICE VISIT (OUTPATIENT)
Dept: FAMILY MEDICINE CLINIC | Facility: CLINIC | Age: 83
End: 2023-08-08
Payer: COMMERCIAL

## 2023-08-08 VITALS
HEIGHT: 72 IN | BODY MASS INDEX: 29.26 KG/M2 | SYSTOLIC BLOOD PRESSURE: 147 MMHG | WEIGHT: 216 LBS | OXYGEN SATURATION: 96 % | HEART RATE: 68 BPM | DIASTOLIC BLOOD PRESSURE: 71 MMHG

## 2023-08-08 VITALS — HEIGHT: 72 IN | WEIGHT: 216 LBS | BODY MASS INDEX: 29.26 KG/M2

## 2023-08-08 DIAGNOSIS — M65.341 TRIGGER RING FINGER OF RIGHT HAND: ICD-10-CM

## 2023-08-08 DIAGNOSIS — M65.331 TRIGGER MIDDLE FINGER OF RIGHT HAND: Primary | ICD-10-CM

## 2023-08-08 DIAGNOSIS — M65.321 TRIGGER INDEX FINGER OF RIGHT HAND: ICD-10-CM

## 2023-08-08 DIAGNOSIS — R01.1 CARDIAC MURMUR: ICD-10-CM

## 2023-08-08 DIAGNOSIS — M65.311 TRIGGER FINGER OF RIGHT THUMB: ICD-10-CM

## 2023-08-08 DIAGNOSIS — R42 DIZZINESS: Primary | ICD-10-CM

## 2023-08-08 DIAGNOSIS — I10 ESSENTIAL HYPERTENSION: ICD-10-CM

## 2023-08-08 DIAGNOSIS — E11.65 TYPE 2 DIABETES MELLITUS WITH HYPERGLYCEMIA, WITHOUT LONG-TERM CURRENT USE OF INSULIN: ICD-10-CM

## 2023-08-08 PROCEDURE — 99214 OFFICE O/P EST MOD 30 MIN: CPT | Performed by: NURSE PRACTITIONER

## 2023-08-08 RX ORDER — HYDROCHLOROTHIAZIDE 12.5 MG/1
12.5 TABLET ORAL DAILY
Qty: 30 TABLET | Refills: 2 | Status: SHIPPED | OUTPATIENT
Start: 2023-08-08

## 2023-08-08 RX ADMIN — TRIAMCINOLONE ACETONIDE 40 MG: 40 INJECTION, SUSPENSION INTRA-ARTICULAR; INTRAMUSCULAR at 15:34

## 2023-08-08 RX ADMIN — LIDOCAINE HYDROCHLORIDE 1 ML: 10 INJECTION, SOLUTION INFILTRATION; PERINEURAL at 15:34

## 2023-08-08 NOTE — PROGRESS NOTES
Chief Complaint  Dizziness, Nausea, and ER Follow Up.    SUBJECTIVE  Lupillo Toledo presents to [facility]     Pt here today to follow up from the ER yesterday for dizzy spells. Pt was prescribed Meclizine. States turned his head quickly and became very dizzy. States he has issues with dizziness fo years, states previous PCP (Bairon) told him it was probably from his murmur. Pt has never had this evaluated. Pt denies any SOB or chest pain with this dizziness. Does have exertional SOB with stairs and such.  Is interested in seeing cardiology    Patient states he just took his first dose of meclizine this morning, reports it has helped with the dizziness    History of Present Illness  Past Medical History:   Diagnosis Date    Allergic rhinitis due to allergen 01/12/2021    Arthritis     Bladder disorder     Bowel disease     BPH (benign prostatic hyperplasia)     Cataract     Cervicalgia     Chronic allergic rhinitis     Colon polyps     Diabetes     Difficulty swallowing     Essential hypertension 01/12/2021    Gall stones     Hepatitis     High cholesterol     Hyperlipemia     Hypertension, essential     Kidney stones     Limb swelling     Neuralgia     Primary osteoarthritis of one hip, right 08/23/2017    Seasonal allergies     Trochanteric bursitis of right hip 08/18/2017      Family History   Problem Relation Age of Onset    Hypertension Father     Bleeding Disorder Mother     Other Mother         BLADDER CALCULUS    Nephrolithiasis Mother     Bleeding Disorder Sister     Bleeding Disorder Brother     Other Other         SPINE PROBLEMS      Past Surgical History:   Procedure Laterality Date    ANKLE SURGERY      BACK SURGERY      CATARACT EXTRACTION      CERVICAL DISC SURGERY      COLONOSCOPY      ENDOSCOPY      HERNIA REPAIR      LUMBAR DISC SURGERY      OTHER SURGICAL HISTORY      ARTIFICAL JOINTS/LIMBS        Current Outpatient Medications:     fexofenadine (Allegra  "Allergy) 180 MG tablet, Take 1 tablet by mouth Daily., Disp: 90 tablet, Rfl: 1    glucose blood (OneTouch Ultra) test strip, 1 each by Other route As Needed (Check BS QD). Use as instructed, Disp: 90 each, Rfl: 3    Lancets (onetouch ultrasoft) lancets, 1 each by Other route As Needed (Check BS QD). Use as instructed, Disp: 90 each, Rfl: 3    meclizine (ANTIVERT) 25 MG tablet, Take 1 tablet by mouth 3 (Three) Times a Day As Needed for Dizziness for up to 7 days., Disp: 21 tablet, Rfl: 0    hydroCHLOROthiazide (HYDRODIURIL) 12.5 MG tablet, Take 1 tablet by mouth Daily., Disp: 30 tablet, Rfl: 2    mupirocin (Bactroban) 2 % cream, Apply 1 application topically to the appropriate area as directed 3 (Three) Times a Day. (Patient not taking: Reported on 8/8/2023), Disp: 30 g, Rfl: 0    tamsulosin (FLOMAX) 0.4 MG capsule 24 hr capsule, Take 1 capsule by mouth Daily. Patient needs to keep yearly follow up appointment with Dr. Augustine for additional refills. (Patient not taking: Reported on 8/8/2023), Disp: 90 capsule, Rfl: 0  No current facility-administered medications for this visit.    OBJECTIVE  Vital Signs:   /71   Pulse 68   Ht 182.9 cm (72\")   Wt 98 kg (216 lb)   SpO2 96%   BMI 29.29 kg/mý    Estimated body mass index is 29.29 kg/mý as calculated from the following:    Height as of this encounter: 182.9 cm (72\").    Weight as of this encounter: 98 kg (216 lb).     Wt Readings from Last 3 Encounters:   08/08/23 98 kg (216 lb)   08/07/23 98.2 kg (216 lb 7.9 oz)   12/15/22 96.8 kg (213 lb 6.4 oz)     BP Readings from Last 3 Encounters:   08/08/23 147/71   08/07/23 121/98   12/15/22 150/74       Physical Exam  Vitals reviewed.   Constitutional:       Appearance: Normal appearance. He is well-developed.   HENT:      Head: Normocephalic and atraumatic.      Right Ear: External ear normal.      Left Ear: External ear normal.   Eyes:      Conjunctiva/sclera: Conjunctivae normal.      Pupils: Pupils are equal, " round, and reactive to light.   Neck:      Vascular: No carotid bruit.   Cardiovascular:      Rate and Rhythm: Normal rate and regular rhythm.      Heart sounds: Murmur heard.     No friction rub. No gallop.   Pulmonary:      Effort: Pulmonary effort is normal.      Breath sounds: Normal breath sounds. No wheezing or rhonchi.   Skin:     General: Skin is warm and dry.   Neurological:      Mental Status: He is alert and oriented to person, place, and time.      Cranial Nerves: No cranial nerve deficit.   Psychiatric:         Mood and Affect: Mood and affect normal.         Behavior: Behavior normal.         Thought Content: Thought content normal.         Judgment: Judgment normal.        Result Review    [facility]          8/7/2023    13:50   CMP   Glucose 250    BUN 16    Creatinine 1.08    EGFR 68.5    Sodium 137    Potassium 3.8    Chloride 103    Calcium 9.2    Total Protein 6.9    Albumin 4.4    Globulin 2.5    Total Bilirubin 0.5    Alkaline Phosphatase 75    AST (SGOT) 13    ALT (SGPT) 17    Albumin/Globulin Ratio 1.8    BUN/Creatinine Ratio 14.8    Anion Gap 11.5      CBC          8/7/2023    13:50   CBC   WBC 6.54    RBC 5.49    Hemoglobin 16.3    Hematocrit 46.8    MCV 85.2    MCH 29.7    MCHC 34.8    RDW 12.7    Platelets 178          Most Recent A1C          1/27/2023    08:15   HGBA1C Most Recent   Hemoglobin A1C 6.80      Lab Results   Component Value Date    OHIJ87IX 30.5 06/01/2022        Lab Results   Component Value Date    FREET4 1.4 01/11/2021        [facility]          8/7/2023    13:50   CMP   Glucose 250    BUN 16    Creatinine 1.08    EGFR 68.5    Sodium 137    Potassium 3.8    Chloride 103    Calcium 9.2    Total Protein 6.9    Albumin 4.4    Globulin 2.5    Total Bilirubin 0.5    Alkaline Phosphatase 75    AST (SGOT) 13    ALT (SGPT) 17    Albumin/Globulin Ratio 1.8    BUN/Creatinine Ratio 14.8    Anion Gap 11.5      CBC          8/7/2023    13:50   CBC   WBC 6.54    RBC 5.49    Hemoglobin 16.3     Hematocrit 46.8    MCV 85.2    MCH 29.7    MCHC 34.8    RDW 12.7    Platelets 178          A1C Last 3 Results          1/27/2023    08:15   HGBA1C Last 3 Results   Hemoglobin A1C 6.80        No Images in the past 120 days found..     The above data has been reviewed by ADOLFO Perkins 08/08/2023 10:17 EDT.          Patient Care Team:  Kiersten Moya APRN as PCP - General (Nurse Practitioner)  Jessica Augustine MD as Consulting Physician (Urology)    BMI is >= 25 and <30. (Overweight) The following options were offered after discussion;: exercise counseling/recommendations and nutrition counseling/recommendations       ASSESSMENT & PLAN    Diagnoses and all orders for this visit:    1. Dizziness (Primary)  -     Ambulatory Referral to Cardiology  -     Duplex Carotid Ultrasound CAR; Future    2. Cardiac murmur  -     Ambulatory Referral to Cardiology    3. Essential hypertension  Overview:  Hypertension not well controlled at present, upon further discussion patient reports he was previously on Maxide, states that he took it more as needed, states that he has not taken it in quite some time, states that it did help with the swelling in his legs.  We will start patient back on small dose hydrochlorothiazide, he will monitor his blood pressure, and swelling and we will follow-up in 1 month for reevaluation    Orders:  -     Ambulatory Referral to Cardiology  -     hydroCHLOROthiazide (HYDRODIURIL) 12.5 MG tablet; Take 1 tablet by mouth Daily.  Dispense: 30 tablet; Refill: 2    4. Type 2 diabetes mellitus with hyperglycemia, without long-term current use of insulin  Assessment & Plan:  Well-controlled with diet and exercise, we will recheck A1c today, continue diet control    Orders:  -     Lipid Panel; Future  -     Hemoglobin A1c; Future  -     Microalbumin / Creatinine Urine Ratio - Urine, Clean Catch; Future         Tobacco Use: Medium Risk    Smoking Tobacco Use: Former    Smokeless Tobacco Use:  Never    Passive Exposure: Not on file       Follow Up     Return in 6 months (on 2/8/2024), or if symptoms worsen or fail to improve.        Patient was given instructions and counseling regarding his condition or for health maintenance advice. Please see specific information pulled into the AVS if appropriate.   I have reviewed information obtained and documented by others and I have confirmed the accuracy of this documented note.    Kiersten Moya, APRN

## 2023-08-08 NOTE — PROGRESS NOTES
"Chief Complaint  Pain and Follow-up of the Right Hand     Subjective      Lupillo Toledo presents to [facility] for follow up evaluation of the right hand. The patient has been treating his trigger fingers conservatively. He has trigger fingers of his right thumb, index and ring finger. He reports symptoms to his middle finger today but no triggering.     No Known Allergies     Social History     Socioeconomic History    Marital status:    Tobacco Use    Smoking status: Former     Packs/day: 1.00     Years: 34.00     Pack years: 34.00     Types: Cigarettes     Quit date: 1967     Years since quittin.2    Smokeless tobacco: Never    Tobacco comments:     STARTED AT AGE 21 AND STOPPED AT AGE 34   Vaping Use    Vaping Use: Never used   Substance and Sexual Activity    Alcohol use: Not Currently    Drug use: Not Currently    Sexual activity: Defer        I reviewed the patient's chief complaint, history of present illness, review of systems, past medical history, surgical history, family history, social history, medications, and allergy list.     Review of Systems     Constitutional: Denies fevers, chills, weight loss  Cardiovascular: Denies chest pain, shortness of breath  Skin: Denies rashes, acute skin changes  Neurologic: Denies headache, loss of consciousness  MSK: Right hand pain      Vital Signs:   Ht 182.9 cm (72\")   Wt 98 kg (216 lb)   BMI 29.29 kg/mý          Physical Exam  General: Alert. No acute distress    Ortho Exam      Right hand- PIP -20 to 100 degrees. Tender to the hayes 3rd MCP. Pain with ROM of the finger. Neurovascularly intact. Sensation to light touch median, radial, ulnar nerve. Positive AIN, PIN, ulnar nerve motor function. Positive pulses.     Small Joint: right middle trigger finger  Consent given by: patient  Site marked: site marked  Timeout: Immediately prior to procedure a time out was called to verify the correct patient, procedure, " equipment, support staff and site/side marked as required   Supporting Documentation  Indications: pain   Procedure Details  Location: long finger - Long finger joint: right.  Preparation: Patient was prepped and draped in the usual sterile fashion  Needle gauge: 23g.  Medications administered: 1 mL lidocaine 1 %; 40 mg triamcinolone acetonide 40 MG/ML  Patient tolerance: patient tolerated the procedure well with no immediate complications          Imaging Results (Most Recent)       None             Result Review :       XR Chest 1 View    Result Date: 8/7/2023  Narrative: PROCEDURE: XR CHEST 1 VW  COMPARISON: [facility], CR, CHEST PA/AP & LAT 2V, 8/14/2006, 8:30.  INDICATIONS: WEAK/DIZZY/AMS TODAY  FINDINGS:   The lungs are well-expanded. The heart and pulmonary vasculature are within normal limits. No pleural effusions are identified. There are no active appearing infiltrates.  Prior reverse total left shoulder arthroplasty.  IMPRESSION: No active disease.  DARLENE OLIVIA MD       Electronically Signed and Approved By: DARLENE OLIVIA MD on 8/07/2023 at 14:31                     Assessment and Plan     Diagnoses and all orders for this visit:    1. Trigger middle finger of right hand (Primary)    2. Trigger finger of right thumb    3. Trigger index finger of right hand    4. Trigger ring finger of right hand        Discussed the treatment plan with the patient.  Discussed the risks and benefits of a right 3rd finger trigger finger injection. The patient expressed understanding and wished to proceed. He tolerated the injection well.     Call or return if worsening symptoms.    Follow Up     PRN      Patient was given instructions and counseling regarding his condition or for health maintenance advice. Please see specific information pulled into the AVS if appropriate.     Scribed for Vipul Cox MD by Jacqueline Lucio.  08/08/23   15:17 EDT    I have personally performed the services  described in this document as scribed by the above individual and it is both accurate and complete. Vipul Cox MD 08/09/23

## 2023-08-09 ENCOUNTER — LAB (OUTPATIENT)
Dept: LAB | Facility: HOSPITAL | Age: 83
End: 2023-08-09
Payer: COMMERCIAL

## 2023-08-09 DIAGNOSIS — R97.20 ELEVATED PSA: ICD-10-CM

## 2023-08-09 DIAGNOSIS — E11.65 TYPE 2 DIABETES MELLITUS WITH HYPERGLYCEMIA, WITHOUT LONG-TERM CURRENT USE OF INSULIN: ICD-10-CM

## 2023-08-09 LAB
ALBUMIN UR-MCNC: 7 MG/DL
CHOLEST SERPL-MCNC: 256 MG/DL (ref 0–200)
CREAT UR-MCNC: 116.9 MG/DL
HBA1C MFR BLD: 8 % (ref 4.8–5.6)
HDLC SERPL-MCNC: 40 MG/DL (ref 40–60)
LDLC SERPL CALC-MCNC: 192 MG/DL (ref 0–100)
LDLC/HDLC SERPL: 4.74 {RATIO}
MICROALBUMIN/CREAT UR: 59.9 MG/G
PSA SERPL-MCNC: 5.01 NG/ML (ref 0–4)
TRIGL SERPL-MCNC: 132 MG/DL (ref 0–150)
VLDLC SERPL-MCNC: 24 MG/DL (ref 5–40)

## 2023-08-09 PROCEDURE — 80061 LIPID PANEL: CPT

## 2023-08-09 PROCEDURE — 84153 ASSAY OF PSA TOTAL: CPT

## 2023-08-09 PROCEDURE — 83036 HEMOGLOBIN GLYCOSYLATED A1C: CPT

## 2023-08-09 PROCEDURE — 36415 COLL VENOUS BLD VENIPUNCTURE: CPT

## 2023-08-09 PROCEDURE — 82043 UR ALBUMIN QUANTITATIVE: CPT

## 2023-08-09 PROCEDURE — 82570 ASSAY OF URINE CREATININE: CPT

## 2023-08-09 RX ORDER — LIDOCAINE HYDROCHLORIDE 10 MG/ML
1 INJECTION, SOLUTION INFILTRATION; PERINEURAL
Status: COMPLETED | OUTPATIENT
Start: 2023-08-08 | End: 2023-08-08

## 2023-08-09 RX ORDER — TRIAMCINOLONE ACETONIDE 40 MG/ML
40 INJECTION, SUSPENSION INTRA-ARTICULAR; INTRAMUSCULAR
Status: COMPLETED | OUTPATIENT
Start: 2023-08-08 | End: 2023-08-08

## 2023-08-14 ENCOUNTER — TELEPHONE (OUTPATIENT)
Dept: FAMILY MEDICINE CLINIC | Facility: CLINIC | Age: 83
End: 2023-08-14
Payer: COMMERCIAL

## 2023-08-14 DIAGNOSIS — E78.5 HYPERLIPIDEMIA, UNSPECIFIED HYPERLIPIDEMIA TYPE: ICD-10-CM

## 2023-08-14 DIAGNOSIS — R42 VERTIGO: Primary | ICD-10-CM

## 2023-08-14 DIAGNOSIS — E11.65 TYPE 2 DIABETES MELLITUS WITH HYPERGLYCEMIA, WITHOUT LONG-TERM CURRENT USE OF INSULIN: ICD-10-CM

## 2023-08-14 RX ORDER — MECLIZINE HYDROCHLORIDE 25 MG/1
25 TABLET ORAL 3 TIMES DAILY PRN
Qty: 21 TABLET | Refills: 0 | Status: SHIPPED | OUTPATIENT
Start: 2023-08-14 | End: 2023-08-21

## 2023-08-14 NOTE — TELEPHONE ENCOUNTER
Patient was prescribed Meclizine in hospital for dizziness. Patient stated Kiersten knew he was on Meclizine. Been taking it and dizziness has only let up when continuing to take medication. Patient says he is almost out and not sure what to do when he does run out. He was also prescribed Triamtherene in hospital and he isn't sure about instructions on that. The Triamtherene isn't in his chart any where. Patient requested to speak with a nurse about this. Also requested they called his wife incase he would miss the call.

## 2023-08-14 NOTE — TELEPHONE ENCOUNTER
I am not sure what he is talking about with the triamterene, need more information,i have sent in a refill of the meclizine, I am also going to go ahead and send in a referral for physical therapy for the dizziness/vertigo

## 2023-08-15 ENCOUNTER — TELEPHONE (OUTPATIENT)
Dept: UROLOGY | Facility: CLINIC | Age: 83
End: 2023-08-15
Payer: COMMERCIAL

## 2023-08-15 RX ORDER — ROSUVASTATIN CALCIUM 10 MG/1
10 TABLET, COATED ORAL DAILY
Qty: 90 TABLET | Refills: 1 | Status: SHIPPED | OUTPATIENT
Start: 2023-08-15

## 2023-08-15 NOTE — TELEPHONE ENCOUNTER
Pt states he use to take Triamterene for his ankle swelling. Pt was given hydrochlorathizide at last OV. [facility] .     Went over labs result with pt as well.

## 2023-08-15 NOTE — TELEPHONE ENCOUNTER
Provider: DR ALONSO  Caller: JERALD ELDER   Relationship to Patient: SPOUSE    Phone Number: [phone]   Reason for Call: PT HAS A CONFLICTING APPT ON 8-21-23, PT DEALING WITH SKIN CANCER AND MIGHT NOT BE OUT OF THAT APPT IN TIME SO APPT WAS CANCELLED.     DR ALONSO'S NEXT APPT NOT UNTIL 11-13-23, NOT SURE IF THAT IS OK OR TOO FAR OUT. PLEASE REVIEW AND GIVE PT'S WIFE A CALL BACK TO SCHEDULE.

## 2023-08-16 NOTE — TELEPHONE ENCOUNTER
I LEFT A DETAILED MESSAGE FOR WIFE THAT SHE WAS CORRECT.  PATIENT DOES NOT NEED TO DO PSA FOR THE APPOINTMENT.

## 2023-08-16 NOTE — TELEPHONE ENCOUNTER
PATIENT'S WIFE CALLED AND CONFIRMED THE APPOINTMENT ON 08/25/23.    SHE SAID SHE WAS TOLD HER  DOESN'T NEED THE PSA, AND THAT IT HAS ALREADY BEEN DONE. IS THIS CORRECT?

## 2023-08-22 ENCOUNTER — PROCEDURE VISIT (OUTPATIENT)
Dept: OTOLARYNGOLOGY | Facility: CLINIC | Age: 83
End: 2023-08-22
Payer: COMMERCIAL

## 2023-08-22 ENCOUNTER — OFFICE VISIT (OUTPATIENT)
Dept: OTOLARYNGOLOGY | Facility: CLINIC | Age: 83
End: 2023-08-22
Payer: COMMERCIAL

## 2023-08-22 VITALS — TEMPERATURE: 97.9 F | HEIGHT: 72 IN | WEIGHT: 211 LBS | BODY MASS INDEX: 28.58 KG/M2

## 2023-08-22 DIAGNOSIS — H72.92 PERFORATION OF LEFT TYMPANIC MEMBRANE: Primary | ICD-10-CM

## 2023-08-22 DIAGNOSIS — H90.A21 SENSORINEURAL HEARING LOSS (SNHL) OF RIGHT EAR WITH RESTRICTED HEARING OF LEFT EAR: ICD-10-CM

## 2023-08-22 DIAGNOSIS — H90.A21 SENSORINEURAL HEARING LOSS (SNHL) OF RIGHT EAR WITH RESTRICTED HEARING OF LEFT EAR: Primary | ICD-10-CM

## 2023-08-22 DIAGNOSIS — H90.A32 MIXED CONDUCTIVE AND SENSORINEURAL HEARING LOSS OF LEFT EAR WITH RESTRICTED HEARING OF RIGHT EAR: ICD-10-CM

## 2023-08-22 DIAGNOSIS — H72.92 PERFORATION OF LEFT TYMPANIC MEMBRANE: ICD-10-CM

## 2023-08-22 PROCEDURE — 92557 COMPREHENSIVE HEARING TEST: CPT | Performed by: AUDIOLOGIST

## 2023-08-22 PROCEDURE — 92567 TYMPANOMETRY: CPT | Performed by: AUDIOLOGIST

## 2023-08-22 PROCEDURE — 99214 OFFICE O/P EST MOD 30 MIN: CPT | Performed by: NURSE PRACTITIONER

## 2023-08-22 NOTE — PROGRESS NOTES
AUDIOMETRIC EVALUATION      Name:  Lupillo Toledo  :  1940  Age:  83 y.o.  Date of Evaluation:  2023       History:  Mr. Toledo is seen today for a hearing evaluation due to aural fullness and a history of PE tubes.    Audiologic Information:  Concerns for Hearing: Yes  PETs: Currently no  Other otologic surgical history: Yes  Aural Pressure/Fullness: Yes each ear  Otalgia: No  Otorrhea: No  Tinnitus: Yes each ear  Dizziness: No  Noise Exposure: Yes   Family history of hearing loss: No  Head trauma requiring hospital stay: No  Chemotherapy: No  Other significant history: No    **Case history obtained in office and through EMR system      EVALUATION:    See audiogram    RESULTS:    Otoscopic Evaluation:        NOTE: Testing completed after ears were examined by the nurse practitioner.    Tympanometry (226 Hz):  Right: Type C  Left: No seal due to perforation    IMPRESSIONS:  Pure tone thresholds for the right ear shows a mild sloping to profound sensorineural hearing loss.  Pure tone thresholds for the left ear shows a moderate to profound mixed hearing loss.  Patient was counseled with regard to the findings.    Amplification needs:  Patient could benefit from amplification if they feel increased communication difficulties.    RECOMMENDATIONS/PLAN:  Follow-up recommendations of the nurse practitioner.  Discussed results   Discussed following up with the [facility] for a hearing aid evaluation.          Jack Montez M.S, [facility]  Licensed Audiologist

## 2023-08-22 NOTE — PROGRESS NOTES
Patient Name: Lupillo Toledo   Visit Date: 08/22/2023   Patient ID: [id number]  Provider: ADOLFO Wasserman    Sex: male  Location: [facility]   YOB: 1940  Location Address: [address] [place] [address], [address] [place] [address],?[place]?[id number]    Primary Care Provider Kiersten Moya APRN  Location Phone: [phone]    Referring Provider: ADOLFO Sol        Chief Complaint  Ear Problem    Subjective   Lupillo Toledo is a 83 y.o. male who presents to [facility] today as a consult from ADOLFO Sol for evaluation of states that he has had multiple sets of PE tubes with the last set being around 2018.  He states in 2020 he saw Dr. Kim who placed a T-tube, it fell out and left a large perforation.  He states since that time he has felt as though his hearing is worsened on the left side.  He also experiences bilateral tinnitus.  He reports a longstanding history with hearing loss having been in the  [employer] and also working at a [employer].  He states he did trial a pair of over-the-counter hearing aids several years ago but did not find these beneficial.  He mainly keeps the left ear dry and has not had any issues with otorrhea or infection.  History of Present Illness    Current Outpatient Medications on File Prior to Visit   Medication Sig    fexofenadine (Allegra Allergy) 180 MG tablet Take 1 tablet by mouth Daily.    glucose blood (OneTouch Ultra) test strip 1 each by Other route As Needed (Check BS QD). Use as instructed    hydroCHLOROthiazide (HYDRODIURIL) 12.5 MG tablet Take 1 tablet by mouth Daily.    Lancets (onetouch ultrasoft) lancets 1 each by Other route As Needed (Check BS QD). Use as instructed    rosuvastatin (Crestor) 10 MG tablet Take 1 tablet by mouth Daily.    tamsulosin (FLOMAX) 0.4 MG capsule 24 hr capsule Take 1 capsule by mouth Daily. Patient needs to keep yearly follow up appointment with Dr. Augustine  "for additional refills.    [] meclizine (ANTIVERT) 25 MG tablet Take 1 tablet by mouth 3 (Three) Times a Day As Needed for Dizziness for up to 7 days.    mupirocin (Bactroban) 2 % cream Apply 1 application topically to the appropriate area as directed 3 (Three) Times a Day. (Patient not taking: Reported on 2023)     No current facility-administered medications on file prior to visit.        Social History     Tobacco Use    Smoking status: Former     Packs/day: 1.00     Years: 34.00     Pack years: 34.00     Types: Cigarettes     Quit date: 1967     Years since quittin.3    Smokeless tobacco: Never    Tobacco comments:     STARTED AT AGE 21 AND STOPPED AT AGE 34   Vaping Use    Vaping Use: Never used   Substance Use Topics    Alcohol use: Not Currently    Drug use: Not Currently       Objective     Vital Signs:   Temp 97.9 øF (36.6 øC) (Temporal)   Ht 182.9 cm (72\")   Wt 95.7 kg (211 lb)   BMI 28.62 kg/mý       Physical Exam  Constitutional:       General: He is not in acute distress.     Appearance: Normal appearance. He is not ill-appearing.   HENT:      Head: Normocephalic and atraumatic.      Jaw: There is normal jaw occlusion. No tenderness or pain on movement.      Salivary Glands: Right salivary gland is not diffusely enlarged or tender. Left salivary gland is not diffusely enlarged or tender.      Right Ear: Tympanic membrane, ear canal and external ear normal.      Left Ear: Tympanic membrane, ear canal and external ear normal. Tympanic membrane is perforated.      Nose: Nose normal. No septal deviation.      Right Sinus: No maxillary sinus tenderness or frontal sinus tenderness.      Left Sinus: No maxillary sinus tenderness or frontal sinus tenderness.      Mouth/Throat:      Lips: Pink. No lesions.      Mouth: Mucous membranes are moist. No oral lesions.      Dentition: Normal dentition.      Tongue: No lesions.      Palate: No mass and lesions.      Pharynx: Oropharynx is clear. " Uvula midline.      Tonsils: No tonsillar exudate.   Eyes:      Extraocular Movements: Extraocular movements intact.      Conjunctiva/sclera: Conjunctivae normal.      Pupils: Pupils are equal, round, and reactive to light.   Neck:      Thyroid: No thyroid mass, thyromegaly or thyroid tenderness.      Trachea: Trachea normal.   Pulmonary:      Effort: Pulmonary effort is normal. No respiratory distress.   Musculoskeletal:         General: Normal range of motion.      Cervical back: Normal range of motion and neck supple. No tenderness.   Lymphadenopathy:      Cervical: No cervical adenopathy.   Skin:     General: Skin is warm and dry.   Neurological:      General: No focal deficit present.      Mental Status: He is alert and oriented to person, place, and time.      Cranial Nerves: No cranial nerve deficit.      Motor: No weakness.      Gait: Gait normal.   Psychiatric:         Mood and Affect: Mood normal.         Behavior: Behavior normal.         Thought Content: Thought content normal.         Judgment: Judgment normal.             Result Review :              Assessment and Plan    Diagnoses and all orders for this visit:    1. Perforation of left tympanic membrane (Primary)  -     Audiometry With Tympanometry; Future    2. Sensorineural hearing loss (SNHL) of right ear with restricted hearing of left ear  -     Audiometry With Tympanometry; Future    3. Mixed conductive and sensorineural hearing loss of left ear with restricted hearing of right ear  -     Audiometry With Tympanometry; Future    On examination today patient has a large stable appearing central perforation of the left tympanic membrane.  We did obtain audiogram and tympanogram testing which showed the right ear with a mild sloping to profound sensorineural hearing loss.  The left ear has a moderate to profound mixed hearing loss.  Speech reception thresholds at 35 dB in the right ear and 65 dB in the left ear.  Word discrimination scores were  87% in the right ear at 65 dB and 53% in the left ear at 90 dB.  Right tympanogram showed negative pressure and left tympanogram showed a perforation.  I did consult with Dr. Hanna who also examined the patient and discussed treatment options of tympanoplasty versus trial of hearing aids.  Recommendations were made to the patient and he [facility] will [facility].  Dr. Hanna will see him back should he elect to pursue tympanoplasty.       Follow Up   No follow-ups on file.  Patient was given instructions and counseling regarding his condition or for health maintenance advice. Please see specific information pulled into the AVS if appropriate.      ADOLFO Wasserman

## 2023-08-25 ENCOUNTER — OFFICE VISIT (OUTPATIENT)
Dept: UROLOGY | Facility: CLINIC | Age: 83
End: 2023-08-25
Payer: COMMERCIAL

## 2023-08-25 VITALS — WEIGHT: 213 LBS | HEIGHT: 72 IN | BODY MASS INDEX: 28.85 KG/M2

## 2023-08-25 DIAGNOSIS — R97.20 ELEVATED PROSTATE SPECIFIC ANTIGEN (PSA): Primary | ICD-10-CM

## 2023-08-25 DIAGNOSIS — N13.8 BPH WITH OBSTRUCTION/LOWER URINARY TRACT SYMPTOMS: ICD-10-CM

## 2023-08-25 DIAGNOSIS — N40.1 BPH WITH OBSTRUCTION/LOWER URINARY TRACT SYMPTOMS: ICD-10-CM

## 2023-08-25 LAB
BILIRUB BLD-MCNC: NEGATIVE MG/DL
CLARITY, POC: CLEAR
COLOR UR: YELLOW
EXPIRATION DATE: ABNORMAL
GLUCOSE UR STRIP-MCNC: NEGATIVE MG/DL
KETONES UR QL: NEGATIVE
LEUKOCYTE EST, POC: NEGATIVE
Lab: ABNORMAL
NITRITE UR-MCNC: NEGATIVE MG/ML
PH UR: 5.5 [PH] (ref 5–8)
PROT UR STRIP-MCNC: ABNORMAL MG/DL
RBC # UR STRIP: NEGATIVE /UL
SP GR UR: 1.02 (ref 1–1.03)
UROBILINOGEN UR QL: NORMAL

## 2023-08-25 PROCEDURE — 99214 OFFICE O/P EST MOD 30 MIN: CPT | Performed by: UROLOGY

## 2023-08-25 PROCEDURE — 81003 URINALYSIS AUTO W/O SCOPE: CPT | Performed by: UROLOGY

## 2023-08-25 RX ORDER — MECLIZINE HYDROCHLORIDE 25 MG/1
25 TABLET ORAL 3 TIMES DAILY PRN
COMMUNITY

## 2023-08-25 RX ORDER — TAMSULOSIN HYDROCHLORIDE 0.4 MG/1
1 CAPSULE ORAL DAILY
Qty: 90 CAPSULE | Refills: 0 | Status: SHIPPED | OUTPATIENT
Start: 2023-08-25

## 2023-08-25 NOTE — PROGRESS NOTES
"Chief Complaint  Elevated PSA    Subjective          Lupillo Toledo presents to [facility] UROLOGY    History of Present Illness  Mr. Toledo is a follow-up for elevated PSA. He has had an elevated PSA at 5.3 in 01/2021. Prior to that, it had been in the 3 range typically. He had a prostate MRI in 03/2021 that was negative and showed no specific lesions to suggest prostate cancer. 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 His PSA from 06/2022 was 3.390, which was back down to his baseline.    His most recent PSA is 5.010 in August 2023.  This is lower than it has been.       Objective   Vital Signs:   Ht 182.9 cm (72\")   Wt 96.6 kg (213 lb)   BMI 28.89 kg/mý       Physical Exam  Vitals and nursing note reviewed.   Constitutional:       Appearance: Normal appearance. He is well-developed.   Pulmonary:      Effort: Pulmonary effort is normal.      Breath sounds: Normal air entry.   Neurological:      Mental Status: He is alert and oriented to person, place, and time.      Motor: Motor function is intact.   Psychiatric:         Mood and Affect: Mood normal.         Behavior: Behavior normal.        Result Review :   The following data was reviewed by: Jessica Augustine MD on 08/25/2023:    Results for orders placed or performed in visit on 08/25/23   POC Urinalysis Dipstick, Automated    Specimen: Urine   Result Value Ref Range    Color Yellow Yellow, Straw, Dark Yellow, Karen    Clarity, UA Clear Clear    Specific Gravity  1.020 1.005 - 1.030    pH, Urine 5.5 5.0 - 8.0    Leukocytes Negative Negative    Nitrite, UA Negative Negative    Protein, POC Trace (A) Negative mg/dL    Glucose, UA Negative Negative mg/dL    Ketones, UA Negative Negative    Urobilinogen, UA Normal Normal, 0.2 E.U./dL    Bilirubin Negative Negative    Blood, UA Negative Negative    Lot Number [id number]     Expiration Date 82,024          PSA          8/9/2023    10:00   PSA   PSA 5.010               Assessment and Plan    Diagnoses and " all orders for this visit:    1. Elevated prostate specific antigen (PSA) (Primary)  -     POC Urinalysis Dipstick, Automated  -     PSA DIAGNOSTIC; Future    2. BPH with obstruction/lower urinary tract symptoms  -     tamsulosin (FLOMAX) 0.4 MG capsule 24 hr capsule; Take 1 capsule by mouth Daily. Patient needs to keep yearly follow up appointment with Dr. Augustine for additional refills.  Dispense: 90 capsule; Refill: 0    PSA and follow up in one year.  No new issues. Continue flomax.  I refilled it today for one year.         Follow Up       Return in about 1 year (around 8/25/2024) for Annual Visit, PSA prior to visit.  Patient was given instructions and counseling regarding his condition or for health maintenance advice. Please see specific information pulled into the AVS if appropriate.

## 2023-08-28 ENCOUNTER — PATIENT ROUNDING (BHMG ONLY) (OUTPATIENT)
Dept: OTOLARYNGOLOGY | Facility: CLINIC | Age: 83
End: 2023-08-28
Payer: COMMERCIAL

## 2023-08-28 NOTE — PROGRESS NOTES
A [employer] ACQUISITION message has been send to the patient for PATIENT ROUNDING with [facility].

## 2023-08-31 ENCOUNTER — OFFICE VISIT (OUTPATIENT)
Dept: CARDIOLOGY | Facility: CLINIC | Age: 83
End: 2023-08-31
Payer: COMMERCIAL

## 2023-08-31 VITALS
WEIGHT: 216.8 LBS | SYSTOLIC BLOOD PRESSURE: 128 MMHG | BODY MASS INDEX: 29.36 KG/M2 | HEIGHT: 72 IN | DIASTOLIC BLOOD PRESSURE: 73 MMHG | HEART RATE: 66 BPM

## 2023-08-31 DIAGNOSIS — R01.1 CARDIAC MURMUR: Primary | ICD-10-CM

## 2023-08-31 DIAGNOSIS — I10 ESSENTIAL HYPERTENSION: ICD-10-CM

## 2023-08-31 DIAGNOSIS — E78.2 MIXED DYSLIPIDEMIA: ICD-10-CM

## 2023-08-31 PROCEDURE — 99204 OFFICE O/P NEW MOD 45 MIN: CPT | Performed by: INTERNAL MEDICINE

## 2023-08-31 NOTE — PROGRESS NOTES
Chief Complaint  Dizziness and Hypertension      History of Present Illness  Lupillo Toledo presents to [facility] CARDIOLOGY    This is a very pleasant 83-year-old gentleman with past medical history as outlined below, remarkable for hypertension and dyslipidemia was referred to clinic for cardiac evaluation.  He was noted to have cardiac murmur on physical examination.  He feels well overall.  He has vertigo spells which improved with meclizine.  He has no chest discomfort, dyspnea, palpitations, presyncope or syncope.  He is physically active around the house without extraordinary limitations.    Past Medical History:   Diagnosis Date    Allergic rhinitis due to allergen 01/12/2021    Arthritis     Bladder disorder     Bowel disease     BPH (benign prostatic hyperplasia)     Cataract     Cervicalgia     Chronic allergic rhinitis     Colon polyps     Diabetes     Difficulty swallowing     Essential hypertension 01/12/2021    Gall stones     Hepatitis     High cholesterol     Hyperlipemia     Hypertension, essential     Kidney stones     Limb swelling     Neuralgia     Primary osteoarthritis of one hip, right 08/23/2017    Seasonal allergies     Trochanteric bursitis of right hip 08/18/2017         Current Outpatient Medications:     fexofenadine (Allegra Allergy) 180 MG tablet, Take 1 tablet by mouth Daily., Disp: 90 tablet, Rfl: 1    glucose blood (OneTouch Ultra) test strip, 1 each by Other route As Needed (Check BS QD). Use as instructed, Disp: 90 each, Rfl: 3    hydroCHLOROthiazide (HYDRODIURIL) 12.5 MG tablet, Take 1 tablet by mouth Daily., Disp: 30 tablet, Rfl: 2    Lancets (onetouch ultrasoft) lancets, 1 each by Other route As Needed (Check BS QD). Use as instructed, Disp: 90 each, Rfl: 3    meclizine (ANTIVERT) 25 MG tablet, Take 1 tablet by mouth 3 (Three) Times a Day As Needed for Dizziness., Disp: , Rfl:     mupirocin (Bactroban) 2 % cream, Apply 1 application topically to the  "appropriate area as directed 3 (Three) Times a Day., Disp: 30 g, Rfl: 0    rosuvastatin (Crestor) 10 MG tablet, Take 1 tablet by mouth Daily., Disp: 90 tablet, Rfl: 1    tamsulosin (FLOMAX) 0.4 MG capsule 24 hr capsule, Take 1 capsule by mouth Daily. Patient needs to keep yearly follow up appointment with Dr. Augustine for additional refills., Disp: 90 capsule, Rfl: 0    There are no discontinued medications.  No Known Allergies     Social History     Tobacco Use    Smoking status: Former     Packs/day: 1.00     Years: 34.00     Pack years: 34.00     Types: Cigarettes     Quit date: 1967     Years since quittin.3    Smokeless tobacco: Never    Tobacco comments:     STARTED AT AGE 21 AND STOPPED AT AGE 34   Vaping Use    Vaping Use: Never used   Substance Use Topics    Alcohol use: Not Currently    Drug use: Not Currently       Family History   Problem Relation Age of Onset    Hypertension Father     Bleeding Disorder Mother     Other Mother         BLADDER CALCULUS    Nephrolithiasis Mother     Bleeding Disorder Sister     Bleeding Disorder Brother     Other Other         SPINE PROBLEMS        Objective     /73   Pulse 66   Ht 182.9 cm (72.01\")   Wt 98.3 kg (216 lb 12.8 oz)   BMI 29.40 kg/mý       Physical Exam  Constitutional:       General: Awake. Not in acute distress.     Appearance: Normal appearance.   Neck:      Vascular: No carotid bruit, hepatojugular reflux or JVD.   Cardiovascular:      Rate and Rhythm: Normal rate and regular rhythm.      Chest Wall: PMI is not displaced.      Heart sounds: Normal heart sounds, S1 normal and S2 normal.  Grade 3/6 systolic murmur at the base and the apex with radiation to left carotid artery. No friction rub. No gallop. No S3 or S4 sounds.    Pulmonary:      Effort: Pulmonary effort is normal.      Breath sounds: Normal breath sounds. No wheezing, rhonchi or rales.   Ext.      Right lower leg: No edema.      Left lower leg: No edema.   Skin:     General: " Skin is warm and dry.      Coloration: Skin is not cyanotic.      Findings: No petechiae or rash.   Neurological:      Mental Status: Alert and oriented x 3  Psychiatric:         Behavior: Behavior is cooperative.       Result Review :     No results found for: PROBNP  CMP          8/7/2023    13:50   CMP   Glucose 250    BUN 16    Creatinine 1.08    EGFR 68.5    Sodium 137    Potassium 3.8    Chloride 103    Calcium 9.2    Total Protein 6.9    Albumin 4.4    Globulin 2.5    Total Bilirubin 0.5    Alkaline Phosphatase 75    AST (SGOT) 13    ALT (SGPT) 17    Albumin/Globulin Ratio 1.8    BUN/Creatinine Ratio 14.8    Anion Gap 11.5      CBC w/diff          8/7/2023    13:50   CBC w/Diff   WBC 6.54    RBC 5.49    Hemoglobin 16.3    Hematocrit 46.8    MCV 85.2    MCH 29.7    MCHC 34.8    RDW 12.7    Platelets 178    Neutrophil Rel % 55.1    Immature Granulocyte Rel % 0.3    Lymphocyte Rel % 32.9    Monocyte Rel % 8.1    Eosinophil Rel % 3.1    Basophil Rel % 0.5       Lab Results   Component Value Date    TSH 2.400 06/01/2022      Lab Results   Component Value Date    FREET4 1.4 01/11/2021      No results found for: DDIMERQUANT  Magnesium   Date Value Ref Range Status   08/07/2023 2.0 1.6 - 2.4 mg/dL Final      No results found for: DIGOXIN   Lab Results   Component Value Date    TROPONINT 18 (H) 08/07/2023      No results found for: POCTROP(       Lipid Panel          8/9/2023    10:00   Lipid Panel   Total Cholesterol 256    Triglycerides 132    HDL Cholesterol 40    VLDL Cholesterol 24    LDL Cholesterol  192    LDL/HDL Ratio 4.74                No results found for this or any previous visit.                Diagnoses and all orders for this visit:    1. Cardiac murmur (Primary)  -     Adult Transthoracic Echo Complete W/ Cont if Necessary Per Protocol; Future    2. Essential hypertension    3. Mixed dyslipidemia      Assessment:    Cardiac murmur: He has cardiac murmur on physical examination as detailed above.  The  remainder of his examination is unremarkable.  Recent ECG was noted and was unremarkable.  Echocardiogram will be done for further evaluation to rule out significant stenosis/regurgitation.    Essential hypertension: Stable on current regimen.  Continue the same.    Mixed dyslipidemia: On rosuvastatin.  Continue the same.    Follow Up       Return for Return to clinic after diagnostic testing, With Melissa MATA.    Patient was given instructions and counseling regarding his condition or for health maintenance advice. Please see specific information pulled into the AVS if appropriate.

## 2023-09-18 ENCOUNTER — TELEPHONE (OUTPATIENT)
Dept: FAMILY MEDICINE CLINIC | Facility: CLINIC | Age: 83
End: 2023-09-18
Payer: COMMERCIAL

## 2023-09-18 NOTE — TELEPHONE ENCOUNTER
HUB TO READ:    Left VM to return call. Please inform Stephon that we do not have the RSV vaccine. They can check with their pharmacy.

## 2023-09-18 NOTE — TELEPHONE ENCOUNTER
Caller: JERALD ELDER    Relationship: Emergency Contact    Best call back number: [phone]     What was the call regarding: JERALD STATES SHE WOULD LIKE THE CPT CODE FOR THE RSV VACCINE TO SEE IF THE PATIENTS INSURANCE WILL COVER IT.

## 2023-09-19 DIAGNOSIS — E78.5 HYPERLIPIDEMIA, UNSPECIFIED HYPERLIPIDEMIA TYPE: ICD-10-CM

## 2023-09-19 RX ORDER — ROSUVASTATIN CALCIUM 10 MG/1
10 TABLET, COATED ORAL DAILY
Qty: 90 TABLET | Refills: 1 | Status: SHIPPED | OUTPATIENT
Start: 2023-09-19

## 2023-11-01 ENCOUNTER — OFFICE VISIT (OUTPATIENT)
Dept: CARDIOLOGY | Facility: CLINIC | Age: 83
End: 2023-11-01
Payer: COMMERCIAL

## 2023-11-01 VITALS
HEIGHT: 72 IN | HEART RATE: 73 BPM | WEIGHT: 214.6 LBS | DIASTOLIC BLOOD PRESSURE: 77 MMHG | BODY MASS INDEX: 29.07 KG/M2 | SYSTOLIC BLOOD PRESSURE: 128 MMHG

## 2023-11-01 DIAGNOSIS — I10 ESSENTIAL HYPERTENSION: ICD-10-CM

## 2023-11-01 DIAGNOSIS — E78.2 MIXED DYSLIPIDEMIA: ICD-10-CM

## 2023-11-01 DIAGNOSIS — I35.0 AORTIC VALVE STENOSIS, ETIOLOGY OF CARDIAC VALVE DISEASE UNSPECIFIED: ICD-10-CM

## 2023-11-01 DIAGNOSIS — R01.1 CARDIAC MURMUR: Primary | ICD-10-CM

## 2023-11-01 PROCEDURE — 99214 OFFICE O/P EST MOD 30 MIN: CPT

## 2023-11-01 NOTE — PROGRESS NOTES
Chief Complaint  Heart Murmur and Follow-up (R/S'd F/U post testing / ECHO results)    Subjective        History of Present Illness  Lupillo Toledo presents to [facility] CARDIOLOGY for follow up.  Patient is an 83-year-old male with past medical history outlined below, significant for hypertension, hyperlipidemia, diabetes who presents for follow-up on recent testing.  He feels good for the most part he has no chest discomfort, dyspnea, palpitations, syncope or presyncope.  He is physically active around the house without extraordinary limitations.      Past Medical History:   Diagnosis Date    Allergic rhinitis due to allergen 2021    Arthritis     Bladder disorder     Bowel disease     BPH (benign prostatic hyperplasia)     Cataract     Cervicalgia     Chronic allergic rhinitis     Colon polyps     Diabetes     Difficulty swallowing     Essential hypertension 2021    Gall stones     Hepatitis     High cholesterol     Hyperlipemia     Hypertension, essential     Kidney stones     Limb swelling     Neuralgia     Primary osteoarthritis of one hip, right 2017    Seasonal allergies     Trochanteric bursitis of right hip 2017       ALLERGY  No Known Allergies     Past Surgical History:   Procedure Laterality Date    ANKLE SURGERY      BACK SURGERY      CATARACT EXTRACTION      CERVICAL DISC SURGERY      COLONOSCOPY      ENDOSCOPY      HERNIA REPAIR      LUMBAR DISC SURGERY      OTHER SURGICAL HISTORY      ARTIFICAL JOINTS/LIMBS        Social History     Socioeconomic History    Marital status:    Tobacco Use    Smoking status: Former     Packs/day: 1.00     Years: 34.00     Additional pack years: 0.00     Total pack years: 34.00     Types: Cigarettes     Quit date: 1967     Years since quittin.5    Smokeless tobacco: Never    Tobacco comments:     STARTED AT AGE 21 AND STOPPED AT AGE 34   Vaping Use    Vaping Use: Never used   Substance and Sexual Activity     "Alcohol use: Not Currently    Drug use: Not Currently    Sexual activity: Defer       Family History   Problem Relation Age of Onset    Hypertension Father     Bleeding Disorder Mother     Other Mother         BLADDER CALCULUS    Nephrolithiasis Mother     Bleeding Disorder Sister     Bleeding Disorder Brother     Other Other         SPINE PROBLEMS        Current Outpatient Medications on File Prior to Visit   Medication Sig    fexofenadine (Allegra Allergy) 180 MG tablet Take 1 tablet by mouth Daily.    glucose blood (OneTouch Ultra) test strip 1 each by Other route As Needed (Check BS QD). Use as instructed    hydroCHLOROthiazide (HYDRODIURIL) 12.5 MG tablet Take 1 tablet by mouth Daily.    Lancets (onetouch ultrasoft) lancets 1 each by Other route As Needed (Check BS QD). Use as instructed    meclizine (ANTIVERT) 25 MG tablet Take 1 tablet by mouth 3 (Three) Times a Day As Needed for Dizziness.    mupirocin (Bactroban) 2 % cream Apply 1 application topically to the appropriate area as directed 3 (Three) Times a Day.    rosuvastatin (Crestor) 10 MG tablet Take 1 tablet by mouth Daily.    tamsulosin (FLOMAX) 0.4 MG capsule 24 hr capsule Take 1 capsule by mouth Daily. Patient needs to keep yearly follow up appointment with Dr. Augustine for additional refills.     No current facility-administered medications on file prior to visit.       Objective   Vitals:    11/01/23 0936   BP: 128/77   Pulse: 73   Weight: 97.3 kg (214 lb 9.6 oz)   Height: 182.9 cm (72\")       Physical Exam  Constitutional:       General: He is awake. He is not in acute distress.     Appearance: Normal appearance.   HENT:      Head: Normocephalic.      Nose: Nose normal. No congestion.   Eyes:      Extraocular Movements: Extraocular movements intact.      Conjunctiva/sclera: Conjunctivae normal.      Pupils: Pupils are equal, round, and reactive to light.   Neck:      Thyroid: No thyromegaly.      Vascular: No JVD.   Cardiovascular:      Rate and " "Rhythm: Normal rate and regular rhythm.      Chest Wall: PMI is not displaced.      Pulses: Normal pulses.      Heart sounds: S1 normal and S2 normal. Murmur heard.      No friction rub. No gallop. No S3 or S4 sounds.   Pulmonary:      Effort: Pulmonary effort is normal.      Breath sounds: Normal breath sounds. No wheezing, rhonchi or rales.   Abdominal:      General: Bowel sounds are normal.      Palpations: Abdomen is soft.      Tenderness: There is no abdominal tenderness.   Musculoskeletal:      Cervical back: No tenderness.      Right lower leg: No edema.      Left lower leg: No edema.   Lymphadenopathy:      Cervical: No cervical adenopathy.   Skin:     General: Skin is warm and dry.      Capillary Refill: Capillary refill takes less than 2 seconds.      Coloration: Skin is not cyanotic.      Findings: No petechiae or rash.      Nails: There is no clubbing.   Neurological:      Mental Status: He is alert.   Psychiatric:         Mood and Affect: Mood normal.         Behavior: Behavior is cooperative.           Result Review     The following data was reviewed by ADOLFO Hensley on 11/01/23.    No results found for: \"PROBNP\"  CMP          8/7/2023    13:50   CMP   Glucose 250    BUN 16    Creatinine 1.08    EGFR 68.5    Sodium 137    Potassium 3.8    Chloride 103    Calcium 9.2    Total Protein 6.9    Albumin 4.4    Globulin 2.5    Total Bilirubin 0.5    Alkaline Phosphatase 75    AST (SGOT) 13    ALT (SGPT) 17    Albumin/Globulin Ratio 1.8    BUN/Creatinine Ratio 14.8    Anion Gap 11.5      CBC w/diff          8/7/2023    13:50   CBC w/Diff   WBC 6.54    RBC 5.49    Hemoglobin 16.3    Hematocrit 46.8    MCV 85.2    MCH 29.7    MCHC 34.8    RDW 12.7    Platelets 178    Neutrophil Rel % 55.1    Immature Granulocyte Rel % 0.3    Lymphocyte Rel % 32.9    Monocyte Rel % 8.1    Eosinophil Rel % 3.1    Basophil Rel % 0.5       Lipid Panel          8/9/2023    10:00   Lipid Panel   Total Cholesterol 256  "   Triglycerides 132    HDL Cholesterol 40    VLDL Cholesterol 24    LDL Cholesterol  192    LDL/HDL Ratio 4.74        Results for orders placed in visit on 10/26/23    Adult Transthoracic Echo Complete W/ Cont if Necessary Per Protocol    Interpretation Summary    Left ventricular systolic function is normal. Calculated left ventricular EF = 65.1%    Left ventricular wall thickness is consistent with mild concentric hypertrophy.    Left ventricular diastolic function is consistent with (grade I) impaired relaxation and age.    The left atrial cavity is mild to moderately dilated.    Mild aortic valve stenosis is present.    Mild dilation of the aortic root is present measuring 4.1 cm.           Procedures    Assessment & Plan  Diagnoses and all orders for this visit:    1. Cardiac murmur (Primary)    2. Essential hypertension    3. Mixed dyslipidemia    4. Aortic valve stenosis, etiology of cardiac valve disease unspecified    1.  Recent echocardiogram demonstrated mild aortic valve stenosis.  Continue to monitor clinically.  2.  Blood pressures well controlled on current antihypertensive regimen.  Continue same  3.  Continue statin therapy.  4.  Mild by most recent echocardiogram.  Plan to repeat echocardiogram every couple years or based on symptomology.    Follow Up   Return in about 6 months (around 5/1/2024) for With .    Patient was given instructions and counseling regarding his condition or for health maintenance advice. Please see specific information pulled into the AVS if appropriate.     Melissa Lucio, APRN  11/01/23  09:39 EDT    Dictated Utilizing Dragon Dictation

## 2023-11-13 ENCOUNTER — LAB (OUTPATIENT)
Dept: LAB | Facility: HOSPITAL | Age: 83
End: 2023-11-13
Payer: COMMERCIAL

## 2023-11-13 DIAGNOSIS — E78.5 HYPERLIPIDEMIA, UNSPECIFIED HYPERLIPIDEMIA TYPE: ICD-10-CM

## 2023-11-13 DIAGNOSIS — E11.65 TYPE 2 DIABETES MELLITUS WITH HYPERGLYCEMIA, WITHOUT LONG-TERM CURRENT USE OF INSULIN: ICD-10-CM

## 2023-11-13 LAB
ALBUMIN SERPL-MCNC: 4.2 G/DL (ref 3.5–5.2)
ALBUMIN/GLOB SERPL: 1.5 G/DL
ALP SERPL-CCNC: 58 U/L (ref 39–117)
ALT SERPL W P-5'-P-CCNC: 17 U/L (ref 1–41)
ANION GAP SERPL CALCULATED.3IONS-SCNC: 10.6 MMOL/L (ref 5–15)
AST SERPL-CCNC: 18 U/L (ref 1–40)
BILIRUB SERPL-MCNC: 0.8 MG/DL (ref 0–1.2)
BUN SERPL-MCNC: 14 MG/DL (ref 8–23)
BUN/CREAT SERPL: 14.7 (ref 7–25)
CALCIUM SPEC-SCNC: 9.4 MG/DL (ref 8.6–10.5)
CHLORIDE SERPL-SCNC: 103 MMOL/L (ref 98–107)
CHOLEST SERPL-MCNC: 154 MG/DL (ref 0–200)
CO2 SERPL-SCNC: 25.4 MMOL/L (ref 22–29)
CREAT SERPL-MCNC: 0.95 MG/DL (ref 0.76–1.27)
EGFRCR SERPLBLD CKD-EPI 2021: 79.9 ML/MIN/1.73
GLOBULIN UR ELPH-MCNC: 2.8 GM/DL
GLUCOSE SERPL-MCNC: 155 MG/DL (ref 65–99)
HBA1C MFR BLD: 7.1 % (ref 4.8–5.6)
HDLC SERPL-MCNC: 45 MG/DL (ref 40–60)
LDLC SERPL CALC-MCNC: 87 MG/DL (ref 0–100)
LDLC/HDLC SERPL: 1.87 {RATIO}
POTASSIUM SERPL-SCNC: 3.8 MMOL/L (ref 3.5–5.2)
PROT SERPL-MCNC: 7 G/DL (ref 6–8.5)
SODIUM SERPL-SCNC: 139 MMOL/L (ref 136–145)
TRIGL SERPL-MCNC: 124 MG/DL (ref 0–150)
VLDLC SERPL-MCNC: 22 MG/DL (ref 5–40)

## 2023-11-13 PROCEDURE — 80053 COMPREHEN METABOLIC PANEL: CPT

## 2023-11-13 PROCEDURE — 36415 COLL VENOUS BLD VENIPUNCTURE: CPT

## 2023-11-13 PROCEDURE — 80061 LIPID PANEL: CPT

## 2023-11-13 PROCEDURE — 83036 HEMOGLOBIN GLYCOSYLATED A1C: CPT

## 2023-11-14 ENCOUNTER — TELEPHONE (OUTPATIENT)
Dept: FAMILY MEDICINE CLINIC | Facility: CLINIC | Age: 83
End: 2023-11-14
Payer: COMMERCIAL

## 2023-11-14 NOTE — TELEPHONE ENCOUNTER
HUB TO RELAY:    Left VM to return call. Please inform patient of lab results:   ----- Message from ADOLFO Sol sent at 11/14/2023  7:05 AM EST -----    A1c is improved at 7.1%, cholesterol much improved, continue current medications

## 2023-12-11 DIAGNOSIS — I10 ESSENTIAL HYPERTENSION: ICD-10-CM

## 2023-12-11 RX ORDER — HYDROCHLOROTHIAZIDE 12.5 MG/1
12.5 TABLET ORAL DAILY
Qty: 90 TABLET | Refills: 0 | Status: SHIPPED | OUTPATIENT
Start: 2023-12-11 | End: 2023-12-13 | Stop reason: SDUPTHER

## 2023-12-13 ENCOUNTER — OFFICE VISIT (OUTPATIENT)
Dept: FAMILY MEDICINE CLINIC | Facility: CLINIC | Age: 83
End: 2023-12-13
Payer: COMMERCIAL

## 2023-12-13 VITALS
DIASTOLIC BLOOD PRESSURE: 64 MMHG | WEIGHT: 213 LBS | HEIGHT: 72 IN | SYSTOLIC BLOOD PRESSURE: 136 MMHG | BODY MASS INDEX: 28.85 KG/M2 | HEART RATE: 71 BPM | OXYGEN SATURATION: 96 %

## 2023-12-13 DIAGNOSIS — S16.1XXA STRAIN OF NECK MUSCLE, INITIAL ENCOUNTER: Primary | ICD-10-CM

## 2023-12-13 DIAGNOSIS — I10 ESSENTIAL HYPERTENSION: ICD-10-CM

## 2023-12-13 DIAGNOSIS — N40.1 BPH WITH OBSTRUCTION/LOWER URINARY TRACT SYMPTOMS: ICD-10-CM

## 2023-12-13 DIAGNOSIS — R42 VERTIGO: ICD-10-CM

## 2023-12-13 DIAGNOSIS — N13.8 BPH WITH OBSTRUCTION/LOWER URINARY TRACT SYMPTOMS: ICD-10-CM

## 2023-12-13 DIAGNOSIS — R60.0 LOWER EXTREMITY EDEMA: ICD-10-CM

## 2023-12-13 RX ORDER — HYDROCHLOROTHIAZIDE 12.5 MG/1
12.5 TABLET ORAL DAILY
Qty: 90 TABLET | Refills: 0 | Status: SHIPPED | OUTPATIENT
Start: 2023-12-13

## 2023-12-13 RX ORDER — NAPROXEN 500 MG/1
500 TABLET ORAL 2 TIMES DAILY WITH MEALS
Qty: 30 TABLET | Refills: 0 | Status: SHIPPED | OUTPATIENT
Start: 2023-12-13

## 2023-12-13 RX ORDER — MECLIZINE HYDROCHLORIDE 25 MG/1
25 TABLET ORAL 3 TIMES DAILY PRN
Qty: 30 TABLET | Refills: 0 | Status: SHIPPED | OUTPATIENT
Start: 2023-12-13

## 2023-12-13 RX ORDER — METHOCARBAMOL 500 MG/1
500 TABLET, FILM COATED ORAL 3 TIMES DAILY PRN
Qty: 30 TABLET | Refills: 0 | Status: SHIPPED | OUTPATIENT
Start: 2023-12-13

## 2023-12-13 RX ORDER — TAMSULOSIN HYDROCHLORIDE 0.4 MG/1
1 CAPSULE ORAL DAILY
Qty: 90 CAPSULE | Refills: 2 | Status: SHIPPED | OUTPATIENT
Start: 2023-12-13

## 2023-12-13 NOTE — ASSESSMENT & PLAN NOTE
Discussed with patient the various causes of lower extremity edema, discussed edema versus actual infection/cellulitis, educated on signs and symptoms, discussed wearing compression hose, will reinitiate hydrochlorothiazide, follow-up if any new or worsening symptoms

## 2023-12-13 NOTE — ASSESSMENT & PLAN NOTE
Blood pressure stable, continue current medications, patient has been out of hydrochlorothiazide for some time, will restart today

## 2023-12-13 NOTE — PROGRESS NOTES
Chief Complaint  Hypertension, Edema, Cellulitis, and Shoulder Pain (Left)    SUBJECTIVE  Lupillo Toledo presents to [facility]     Pt here today today due to possible cellulitis on legs from swelling. Pt ran out of his HCTZ due to an issue with the pharmacy, states legs became swollen and very itchy.  They have since then improved and are essentially back at baseline.    Patient also complaining of left-sided neck and shoulder pain, states he has had issues with it off and on for years, states that it has flared back up again.    History of Present Illness  Past Medical History:   Diagnosis Date    Allergic rhinitis due to allergen 01/12/2021    Arthritis     Bladder disorder     Bowel disease     BPH (benign prostatic hyperplasia)     Cataract     Cervicalgia     Chronic allergic rhinitis     Colon polyps     Diabetes     Difficulty swallowing     Essential hypertension 01/12/2021    Gall stones     Hepatitis     High cholesterol     Hyperlipemia     Hypertension, essential     Kidney stones     Limb swelling     Neuralgia     Primary osteoarthritis of one hip, right 08/23/2017    Seasonal allergies     Trochanteric bursitis of right hip 08/18/2017      Family History   Problem Relation Age of Onset    Hypertension Father     Bleeding Disorder Mother     Other Mother         BLADDER CALCULUS    Nephrolithiasis Mother     Bleeding Disorder Sister     Bleeding Disorder Brother     Other Other         SPINE PROBLEMS      Past Surgical History:   Procedure Laterality Date    ANKLE SURGERY      BACK SURGERY      CATARACT EXTRACTION      CERVICAL DISC SURGERY      COLONOSCOPY      ENDOSCOPY      HERNIA REPAIR      LUMBAR DISC SURGERY      OTHER SURGICAL HISTORY      ARTIFICAL JOINTS/LIMBS        Current Outpatient Medications:     fexofenadine (Allegra Allergy) 180 MG tablet, Take 1 tablet by mouth Daily., Disp: 90 tablet, Rfl: 1    glucose blood (OneTouch Ultra) test strip, 1 each by  "Other route As Needed (Check BS QD). Use as instructed, Disp: 90 each, Rfl: 3    hydroCHLOROthiazide (HYDRODIURIL) 12.5 MG tablet, Take 1 tablet by mouth Daily., Disp: 90 tablet, Rfl: 0    Lancets (onetouch ultrasoft) lancets, 1 each by Other route As Needed (Check BS QD). Use as instructed, Disp: 90 each, Rfl: 3    meclizine (ANTIVERT) 25 MG tablet, Take 1 tablet by mouth 3 (Three) Times a Day As Needed for Dizziness., Disp: 30 tablet, Rfl: 0    rosuvastatin (Crestor) 10 MG tablet, Take 1 tablet by mouth Daily., Disp: 90 tablet, Rfl: 1    methocarbamol (ROBAXIN) 500 MG tablet, Take 1 tablet by mouth 3 (Three) Times a Day As Needed for Muscle Spasms., Disp: 30 tablet, Rfl: 0    naproxen (Naprosyn) 500 MG tablet, Take 1 tablet by mouth 2 (Two) Times a Day With Meals., Disp: 30 tablet, Rfl: 0    tamsulosin (FLOMAX) 0.4 MG capsule 24 hr capsule, Take 1 capsule by mouth Daily., Disp: 90 capsule, Rfl: 2    OBJECTIVE  Vital Signs:   /64   Pulse 71   Ht 182.9 cm (72\")   Wt 96.6 kg (213 lb)   SpO2 96%   BMI 28.89 kg/m²    Estimated body mass index is 28.89 kg/m² as calculated from the following:    Height as of this encounter: 182.9 cm (72\").    Weight as of this encounter: 96.6 kg (213 lb).     Wt Readings from Last 3 Encounters:   12/13/23 96.6 kg (213 lb)   11/01/23 97.3 kg (214 lb 9.6 oz)   10/26/23 98 kg (216 lb)     BP Readings from Last 3 Encounters:   12/13/23 136/64   11/01/23 128/77   10/26/23 128/73       Physical Exam  Vitals reviewed.   Constitutional:       Appearance: Normal appearance. He is well-developed.   HENT:      Head: Normocephalic and atraumatic.      Right Ear: External ear normal.      Left Ear: External ear normal.   Eyes:      Conjunctiva/sclera: Conjunctivae normal.      Pupils: Pupils are equal, round, and reactive to light.   Cardiovascular:      Rate and Rhythm: Normal rate and regular rhythm.      Heart sounds: No murmur heard.     No friction rub. No gallop.   Pulmonary:      " Effort: Pulmonary effort is normal.      Breath sounds: Normal breath sounds. No wheezing or rhonchi.   Musculoskeletal:      Cervical back: Tenderness present. No bony tenderness. Pain with movement present.      Comments: Left side neck and trap mildly tender to palpation, pain with range of motion/rotation   Skin:     General: Skin is warm and dry.      Comments: Bilateral lower extremities trace to 1+ pitting edema noted, no erythema, no warmth, skin noted to be dry   Neurological:      Mental Status: He is alert and oriented to person, place, and time.      Cranial Nerves: No cranial nerve deficit.   Psychiatric:         Mood and Affect: Mood and affect normal.         Behavior: Behavior normal.         Thought Content: Thought content normal.         Judgment: Judgment normal.          Result Review    CMP          8/7/2023    13:50 11/13/2023    09:21   CMP   Glucose 250  155    BUN 16  14    Creatinine 1.08  0.95    EGFR 68.5  79.9    Sodium 137  139    Potassium 3.8  3.8    Chloride 103  103    Calcium 9.2  9.4    Total Protein 6.9  7.0    Albumin 4.4  4.2    Globulin 2.5  2.8    Total Bilirubin 0.5  0.8    Alkaline Phosphatase 75  58    AST (SGOT) 13  18    ALT (SGPT) 17  17    Albumin/Globulin Ratio 1.8  1.5    BUN/Creatinine Ratio 14.8  14.7    Anion Gap 11.5  10.6      CBC          8/7/2023    13:50   CBC   WBC 6.54    RBC 5.49    Hemoglobin 16.3    Hematocrit 46.8    MCV 85.2    MCH 29.7    MCHC 34.8    RDW 12.7    Platelets 178      Lipid Panel          8/9/2023    10:00 11/13/2023    09:21   Lipid Panel   Total Cholesterol 256  154    Triglycerides 132  124    HDL Cholesterol 40  45    VLDL Cholesterol 24  22    LDL Cholesterol  192  87    LDL/HDL Ratio 4.74  1.87        Most Recent A1C          11/13/2023    09:21   HGBA1C Most Recent   Hemoglobin A1C 7.10        A1C Last 3 Results          1/27/2023    08:15 8/9/2023    10:00 11/13/2023    09:21   HGBA1C Last 3 Results   Hemoglobin A1C 6.80  8.00   7.10      Lab Results   Component Value Date    NIQK98XM 30.5 06/01/2022        Lab Results   Component Value Date    FREET4 1.4 01/11/2021          No Images in the past 120 days found..     The above data has been reviewed by ADOLFO Perkins 12/13/2023 07:44 EST.          Patient Care Team:  Kiersten Moya APRN as PCP - General (Nurse Practitioner)  Jessica Augustine MD as Consulting Physician (Urology)            ASSESSMENT & PLAN    Diagnoses and all orders for this visit:    1. Strain of neck muscle, initial encounter (Primary)  -     naproxen (Naprosyn) 500 MG tablet; Take 1 tablet by mouth 2 (Two) Times a Day With Meals.  Dispense: 30 tablet; Refill: 0  -     methocarbamol (ROBAXIN) 500 MG tablet; Take 1 tablet by mouth 3 (Three) Times a Day As Needed for Muscle Spasms.  Dispense: 30 tablet; Refill: 0    2. Essential hypertension  Comments:  Follow-up if no improvement  Assessment & Plan:  Blood pressure stable, continue current medications, patient has been out of hydrochlorothiazide for some time, will restart today    Orders:  -     hydroCHLOROthiazide (HYDRODIURIL) 12.5 MG tablet; Take 1 tablet by mouth Daily.  Dispense: 90 tablet; Refill: 0    3. Vertigo  Overview:  Well-controlled with as needed use of meclizine, continue current medication    Orders:  -     meclizine (ANTIVERT) 25 MG tablet; Take 1 tablet by mouth 3 (Three) Times a Day As Needed for Dizziness.  Dispense: 30 tablet; Refill: 0    4. Lower extremity edema  Assessment & Plan:  Discussed with patient the various causes of lower extremity edema, discussed edema versus actual infection/cellulitis, educated on signs and symptoms, discussed wearing compression hose, will reinitiate hydrochlorothiazide, follow-up if any new or worsening symptoms           Tobacco Use: Medium Risk (12/13/2023)    Patient History     Smoking Tobacco Use: Former     Smokeless Tobacco Use: Never     Passive Exposure: Not on file       Follow Up      Return in about 6 months (around 6/13/2024), or if symptoms worsen or fail to improve.        Patient was given instructions and counseling regarding his condition or for health maintenance advice. Please see specific information pulled into the AVS if appropriate.   I have reviewed information obtained and documented by others and I have confirmed the accuracy of this documented note.    Kiersten Moya, APRN

## 2024-03-04 DIAGNOSIS — E78.5 HYPERLIPIDEMIA, UNSPECIFIED HYPERLIPIDEMIA TYPE: ICD-10-CM

## 2024-03-04 RX ORDER — ROSUVASTATIN CALCIUM 10 MG/1
10 TABLET, COATED ORAL DAILY
Qty: 90 TABLET | Refills: 0 | Status: SHIPPED | OUTPATIENT
Start: 2024-03-04

## 2024-06-18 ENCOUNTER — OFFICE VISIT (OUTPATIENT)
Dept: FAMILY MEDICINE CLINIC | Facility: CLINIC | Age: 84
End: 2024-06-18
Payer: COMMERCIAL

## 2024-06-18 VITALS
BODY MASS INDEX: 28.99 KG/M2 | SYSTOLIC BLOOD PRESSURE: 140 MMHG | HEIGHT: 72 IN | DIASTOLIC BLOOD PRESSURE: 62 MMHG | HEART RATE: 62 BPM | OXYGEN SATURATION: 97 % | WEIGHT: 214 LBS

## 2024-06-18 DIAGNOSIS — J30.2 SEASONAL ALLERGIC RHINITIS, UNSPECIFIED TRIGGER: ICD-10-CM

## 2024-06-18 DIAGNOSIS — I10 ESSENTIAL HYPERTENSION: ICD-10-CM

## 2024-06-18 DIAGNOSIS — E78.5 HYPERLIPIDEMIA, UNSPECIFIED HYPERLIPIDEMIA TYPE: ICD-10-CM

## 2024-06-18 DIAGNOSIS — E11.65 TYPE 2 DIABETES MELLITUS WITH HYPERGLYCEMIA, WITHOUT LONG-TERM CURRENT USE OF INSULIN: ICD-10-CM

## 2024-06-18 DIAGNOSIS — L29.9 ITCHING: Primary | ICD-10-CM

## 2024-06-18 DIAGNOSIS — R21 RASH: ICD-10-CM

## 2024-06-18 PROCEDURE — 99214 OFFICE O/P EST MOD 30 MIN: CPT | Performed by: NURSE PRACTITIONER

## 2024-06-18 RX ORDER — CETIRIZINE HYDROCHLORIDE 10 MG/1
10 TABLET ORAL DAILY
Qty: 90 TABLET | Refills: 1 | Status: SHIPPED | OUTPATIENT
Start: 2024-06-18

## 2024-06-18 RX ORDER — ROSUVASTATIN CALCIUM 10 MG/1
10 TABLET, COATED ORAL DAILY
Qty: 90 TABLET | Refills: 1 | Status: SHIPPED | OUTPATIENT
Start: 2024-06-18

## 2024-06-18 RX ORDER — HYDROCHLOROTHIAZIDE 12.5 MG/1
12.5 TABLET ORAL DAILY
Qty: 90 TABLET | Refills: 1 | Status: SHIPPED | OUTPATIENT
Start: 2024-06-18

## 2024-06-18 NOTE — PROGRESS NOTES
Chief Complaint  Leg Swelling, Hypertension, Hyperlipidemia, and Arthritis    SUBJECTIVE  Lupillo Toledo presents to [facility] for follow up on Hypertension, Hyperlipidemia, and Arthritis.    Pt states he has been out of his meds for about 2 months, pt is having some leg swelling due to not taking his HCTZ.     Pt c/o rash/bilat upper ext itching x a couple months.     History of Present Illness  Past Medical History:   Diagnosis Date    Allergic rhinitis due to allergen 01/12/2021    Arthritis     Bladder disorder     Bowel disease     BPH (benign prostatic hyperplasia)     Cataract     Cervicalgia     Chronic allergic rhinitis     Colon polyps     Diabetes     Difficulty swallowing     Essential hypertension 01/12/2021    Gall stones     Hepatitis     High cholesterol     Hyperlipemia     Hypertension, essential     Kidney stones     Limb swelling     Neuralgia     Primary osteoarthritis of one hip, right 08/23/2017    Seasonal allergies     Trochanteric bursitis of right hip 08/18/2017      Family History   Problem Relation Age of Onset    Hypertension Father     Bleeding Disorder Mother     Other Mother         BLADDER CALCULUS    Nephrolithiasis Mother     Bleeding Disorder Sister     Bleeding Disorder Brother     Other Other         SPINE PROBLEMS      Past Surgical History:   Procedure Laterality Date    ANKLE SURGERY      BACK SURGERY      CATARACT EXTRACTION      CERVICAL DISC SURGERY      COLONOSCOPY      ENDOSCOPY      HERNIA REPAIR      LUMBAR DISC SURGERY      OTHER SURGICAL HISTORY      ARTIFICAL JOINTS/LIMBS        Current Outpatient Medications:     glucose blood (OneTouch Ultra) test strip, 1 each by Other route As Needed (Check BS QD). Use as instructed, Disp: 90 each, Rfl: 3    hydroCHLOROthiazide 12.5 MG tablet, Take 1 tablet by mouth Daily., Disp: 90 tablet, Rfl: 1    Lancets (onetouch ultrasoft) lancets, 1 each by Other route As Needed (Check BS QD). Use as  "instructed, Disp: 90 each, Rfl: 3    rosuvastatin (CRESTOR) 10 MG tablet, Take 1 tablet by mouth Daily., Disp: 90 tablet, Rfl: 1    tamsulosin (FLOMAX) 0.4 MG capsule 24 hr capsule, Take 1 capsule by mouth Daily., Disp: 90 capsule, Rfl: 2    cetirizine (zyrTEC) 10 MG tablet, Take 1 tablet by mouth Daily., Disp: 90 tablet, Rfl: 1    OBJECTIVE  Vital Signs:   /62   Pulse 62   Ht 182.9 cm (72\")   Wt 97.1 kg (214 lb)   SpO2 97%   BMI 29.02 kg/m²    Estimated body mass index is 29.02 kg/m² as calculated from the following:    Height as of this encounter: 182.9 cm (72\").    Weight as of this encounter: 97.1 kg (214 lb).     Wt Readings from Last 3 Encounters:   06/18/24 97.1 kg (214 lb)   12/13/23 96.6 kg (213 lb)   11/01/23 97.3 kg (214 lb 9.6 oz)     BP Readings from Last 3 Encounters:   06/18/24 140/62   12/13/23 136/64   11/01/23 128/77       Physical Exam  Vitals reviewed.   Constitutional:       Appearance: Normal appearance. He is well-developed.   HENT:      Head: Normocephalic and atraumatic.      Right Ear: External ear normal.      Left Ear: External ear normal.   Eyes:      Conjunctiva/sclera: Conjunctivae normal.      Pupils: Pupils are equal, round, and reactive to light.   Cardiovascular:      Rate and Rhythm: Normal rate and regular rhythm.      Heart sounds: No murmur heard.     No friction rub. No gallop.   Pulmonary:      Effort: Pulmonary effort is normal.      Breath sounds: Normal breath sounds. No wheezing or rhonchi.   Skin:     General: Skin is warm and dry.      Comments: Bilateral upper extremities with excoriations noted from scratching, scattered erythematous macules and papules noted   Neurological:      Mental Status: He is alert and oriented to person, place, and time.      Cranial Nerves: No cranial nerve deficit.   Psychiatric:         Mood and Affect: Mood and affect normal.         Behavior: Behavior normal.         Thought Content: Thought content normal.         Judgment: " Judgment normal.          Result Review    CMP          8/7/2023    13:50 11/13/2023    09:21   CMP   Glucose 250  155    BUN 16  14    Creatinine 1.08  0.95    EGFR 68.5  79.9    Sodium 137  139    Potassium 3.8  3.8    Chloride 103  103    Calcium 9.2  9.4    Total Protein 6.9  7.0    Albumin 4.4  4.2    Globulin 2.5  2.8    Total Bilirubin 0.5  0.8    Alkaline Phosphatase 75  58    AST (SGOT) 13  18    ALT (SGPT) 17  17    Albumin/Globulin Ratio 1.8  1.5    BUN/Creatinine Ratio 14.8  14.7    Anion Gap 11.5  10.6      CBC          8/7/2023    13:50   CBC   WBC 6.54    RBC 5.49    Hemoglobin 16.3    Hematocrit 46.8    MCV 85.2    MCH 29.7    MCHC 34.8    RDW 12.7    Platelets 178      Lipid Panel          8/9/2023    10:00 11/13/2023    09:21   Lipid Panel   Total Cholesterol 256  154    Triglycerides 132  124    HDL Cholesterol 40  45    VLDL Cholesterol 24  22    LDL Cholesterol  192  87    LDL/HDL Ratio 4.74  1.87        Most Recent A1C          11/13/2023    09:21   HGBA1C Most Recent   Hemoglobin A1C 7.10        A1C Last 3 Results          8/9/2023    10:00 11/13/2023    09:21   HGBA1C Last 3 Results   Hemoglobin A1C 8.00  7.10      Lab Results   Component Value Date    GAVJ73RM 30.5 06/01/2022        Lab Results   Component Value Date    FREET4 1.4 01/11/2021       No Images in the past 120 days found..     The above data has been reviewed by ADOLFO Perkins 06/18/2024 09:25 EDT.          Patient Care Team:  Kiersten Moya APRN as PCP - General (Nurse Practitioner)  Jessica Augustine MD as Consulting Physician (Urology)            ASSESSMENT & PLAN    Diagnoses and all orders for this visit:    1. Itching (Primary)  -     TSH+Free T4; Future  -     Ambulatory Referral to Dermatology    2. Rash  -     Comprehensive Metabolic Panel; Future  -     CBC w AUTO Differential; Future  -     TSH+Free T4; Future  -     Ambulatory Referral to Dermatology    3. Essential hypertension  Assessment &  Plan:  Blood pressure is a bit elevated at present, however patient has been out of his hydrochlorothiazide for some time, he will resume this medication today and monitor blood pressure at home, if it does not return to goal he will follow-up, otherwise we will follow-up in 6 months    Orders:  -     hydroCHLOROthiazide 12.5 MG tablet; Take 1 tablet by mouth Daily.  Dispense: 90 tablet; Refill: 1    4. Type 2 diabetes mellitus with hyperglycemia, without long-term current use of insulin  Assessment & Plan:  Controlled with diet and exercise only, we will recheck A1c today    Orders:  -     Comprehensive Metabolic Panel; Future  -     Lipid Panel; Future  -     Hemoglobin A1c; Future  -     Microalbumin / Creatinine Urine Ratio - Urine, Clean Catch; Future    5. Hyperlipidemia, unspecified hyperlipidemia type  Assessment & Plan:  Patient states doing okay with the Crestor, we will recheck labs today    Orders:  -     rosuvastatin (CRESTOR) 10 MG tablet; Take 1 tablet by mouth Daily.  Dispense: 90 tablet; Refill: 1    6. Essential hypertension  Comments:  Follow-up if no improvement  Assessment & Plan:  Blood pressure is a bit elevated at present, however patient has been out of his hydrochlorothiazide for some time, he will resume this medication today and monitor blood pressure at home, if it does not return to goal he will follow-up, otherwise we will follow-up in 6 months    Orders:  -     hydroCHLOROthiazide 12.5 MG tablet; Take 1 tablet by mouth Daily.  Dispense: 90 tablet; Refill: 1    7. Seasonal allergic rhinitis, unspecified trigger  -     cetirizine (zyrTEC) 10 MG tablet; Take 1 tablet by mouth Daily.  Dispense: 90 tablet; Refill: 1         Tobacco Use: Medium Risk (6/18/2024)    Patient History     Smoking Tobacco Use: Former     Smokeless Tobacco Use: Never     Passive Exposure: Not on file       Follow Up     Return in about 6 months (around 12/18/2024), or if symptoms worsen or fail to  improve.        Patient was given instructions and counseling regarding his condition or for health maintenance advice. Please see specific information pulled into the AVS if appropriate.   I have reviewed information obtained and documented by others and I have confirmed the accuracy of this documented note.    ADOLFO Perkins

## 2024-06-25 NOTE — ASSESSMENT & PLAN NOTE
Blood pressure is a bit elevated at present, however patient has been out of his hydrochlorothiazide for some time, he will resume this medication today and monitor blood pressure at home, if it does not return to goal he will follow-up, otherwise we will follow-up in 6 months

## 2024-06-27 ENCOUNTER — TELEPHONE (OUTPATIENT)
Dept: FAMILY MEDICINE CLINIC | Facility: CLINIC | Age: 84
End: 2024-06-27
Payer: COMMERCIAL

## 2024-06-27 NOTE — TELEPHONE ENCOUNTER
Caller: JERALD ELDER    Relationship to patient: Emergency Contact    Best call back number: [phone]    Patient is needing: PATIENTS WIFE CALLED TO CHECK ON THE STATUS OF HIS REFERRAL FOR DERMATOLOGY. WIFE STATES IT HAS BEEN A FEW WEEKS AND THEY HAVE NOT HEARD FROM ANYONE REGARDING SCHEDULING.

## 2024-06-27 NOTE — TELEPHONE ENCOUNTER
Pt will also come by office tomorrow or next week for Tdap. Pt wanted one at last [facility] but we was out of stock. Insurance is [employer].

## 2024-06-27 NOTE — TELEPHONE ENCOUNTER
Spoke with pts wife, they would like to go to [facility] as he has been there before. Changing and faxing order. Pt will call to schedule.

## 2024-07-03 ENCOUNTER — CLINICAL SUPPORT (OUTPATIENT)
Dept: FAMILY MEDICINE CLINIC | Facility: CLINIC | Age: 84
End: 2024-07-03
Payer: COMMERCIAL

## 2024-07-03 DIAGNOSIS — Z23 NEED FOR DIPHTHERIA-TETANUS-PERTUSSIS (TDAP) VACCINE: Primary | ICD-10-CM

## 2024-07-03 PROCEDURE — 90471 IMMUNIZATION ADMIN: CPT | Performed by: NURSE PRACTITIONER

## 2024-07-03 PROCEDURE — 90715 TDAP VACCINE 7 YRS/> IM: CPT | Performed by: NURSE PRACTITIONER

## 2024-07-26 ENCOUNTER — TELEPHONE (OUTPATIENT)
Dept: FAMILY MEDICINE CLINIC | Facility: CLINIC | Age: 84
End: 2024-07-26
Payer: COMMERCIAL

## 2024-07-26 NOTE — TELEPHONE ENCOUNTER
Caller: JERALD ELDER    Relationship to patient: Emergency Contact    Best call back number: [phone]    Additional notes: PATIENT'S WIFE IS ASKING FOR INFORMATION ON THE DUPLEX SCAN THAT SHE SEES AN ORDER FOR ON PATIENT'S [employer] [facility] [facility].

## 2024-07-29 NOTE — TELEPHONE ENCOUNTER
Not sure why the patient did not have it done, it was ordered after an ER follow-up for dizziness evaluation, patient was also referred to ENT and cardiology for those symptoms and it does appear that he was seen by them

## 2024-07-30 NOTE — TELEPHONE ENCOUNTER
Spoke with pt, pt states he's not having dizzy spells anymore, if he does he takes a meclizine and is fine. Pt stated he would like to cancel for now and will call back if anything changes. Order cancelled.

## 2024-08-06 ENCOUNTER — LAB (OUTPATIENT)
Dept: LAB | Facility: HOSPITAL | Age: 84
End: 2024-08-06
Payer: COMMERCIAL

## 2024-08-06 DIAGNOSIS — E11.65 TYPE 2 DIABETES MELLITUS WITH HYPERGLYCEMIA, WITHOUT LONG-TERM CURRENT USE OF INSULIN: ICD-10-CM

## 2024-08-06 DIAGNOSIS — L29.9 ITCHING: ICD-10-CM

## 2024-08-06 DIAGNOSIS — R21 RASH: ICD-10-CM

## 2024-08-06 DIAGNOSIS — R97.20 ELEVATED PROSTATE SPECIFIC ANTIGEN (PSA): ICD-10-CM

## 2024-08-06 LAB
ALBUMIN SERPL-MCNC: 4.2 G/DL (ref 3.5–5.2)
ALBUMIN UR-MCNC: 3.2 MG/DL
ALBUMIN/GLOB SERPL: 1.9 G/DL
ALP SERPL-CCNC: 61 U/L (ref 39–117)
ALT SERPL W P-5'-P-CCNC: 16 U/L (ref 1–41)
ANION GAP SERPL CALCULATED.3IONS-SCNC: 10.2 MMOL/L (ref 5–15)
AST SERPL-CCNC: 12 U/L (ref 1–40)
BASOPHILS # BLD AUTO: 0.04 10*3/MM3 (ref 0–0.2)
BASOPHILS NFR BLD AUTO: 0.6 % (ref 0–1.5)
BILIRUB SERPL-MCNC: 0.6 MG/DL (ref 0–1.2)
BUN SERPL-MCNC: 15 MG/DL (ref 8–23)
BUN/CREAT SERPL: 15.3 (ref 7–25)
CALCIUM SPEC-SCNC: 9.2 MG/DL (ref 8.6–10.5)
CHLORIDE SERPL-SCNC: 101 MMOL/L (ref 98–107)
CHOLEST SERPL-MCNC: 159 MG/DL (ref 0–200)
CO2 SERPL-SCNC: 25.8 MMOL/L (ref 22–29)
CREAT SERPL-MCNC: 0.98 MG/DL (ref 0.76–1.27)
CREAT UR-MCNC: 79.7 MG/DL
DEPRECATED RDW RBC AUTO: 40.9 FL (ref 37–54)
EGFRCR SERPLBLD CKD-EPI 2021: 76.5 ML/MIN/1.73
EOSINOPHIL # BLD AUTO: 0.21 10*3/MM3 (ref 0–0.4)
EOSINOPHIL NFR BLD AUTO: 3.1 % (ref 0.3–6.2)
ERYTHROCYTE [DISTWIDTH] IN BLOOD BY AUTOMATED COUNT: 12.7 % (ref 12.3–15.4)
GLOBULIN UR ELPH-MCNC: 2.2 GM/DL
GLUCOSE SERPL-MCNC: 211 MG/DL (ref 65–99)
HBA1C MFR BLD: 8.1 % (ref 4.8–5.6)
HCT VFR BLD AUTO: 45.7 % (ref 37.5–51)
HDLC SERPL-MCNC: 45 MG/DL (ref 40–60)
HGB BLD-MCNC: 15.3 G/DL (ref 13–17.7)
IMM GRANULOCYTES # BLD AUTO: 0.02 10*3/MM3 (ref 0–0.05)
IMM GRANULOCYTES NFR BLD AUTO: 0.3 % (ref 0–0.5)
LDLC SERPL CALC-MCNC: 93 MG/DL (ref 0–100)
LDLC/HDLC SERPL: 2.03 {RATIO}
LYMPHOCYTES # BLD AUTO: 2.27 10*3/MM3 (ref 0.7–3.1)
LYMPHOCYTES NFR BLD AUTO: 33.9 % (ref 19.6–45.3)
MCH RBC QN AUTO: 29.5 PG (ref 26.6–33)
MCHC RBC AUTO-ENTMCNC: 33.5 G/DL (ref 31.5–35.7)
MCV RBC AUTO: 88.2 FL (ref 79–97)
MICROALBUMIN/CREAT UR: 40.2 MG/G (ref 0–29)
MONOCYTES # BLD AUTO: 0.47 10*3/MM3 (ref 0.1–0.9)
MONOCYTES NFR BLD AUTO: 7 % (ref 5–12)
NEUTROPHILS NFR BLD AUTO: 3.68 10*3/MM3 (ref 1.7–7)
NEUTROPHILS NFR BLD AUTO: 55.1 % (ref 42.7–76)
NRBC BLD AUTO-RTO: 0 /100 WBC (ref 0–0.2)
PLATELET # BLD AUTO: 164 10*3/MM3 (ref 140–450)
PMV BLD AUTO: 10.4 FL (ref 6–12)
POTASSIUM SERPL-SCNC: 4 MMOL/L (ref 3.5–5.2)
PROT SERPL-MCNC: 6.4 G/DL (ref 6–8.5)
PSA SERPL-MCNC: 3.13 NG/ML (ref 0–4)
RBC # BLD AUTO: 5.18 10*6/MM3 (ref 4.14–5.8)
SODIUM SERPL-SCNC: 137 MMOL/L (ref 136–145)
T4 FREE SERPL-MCNC: 1.24 NG/DL (ref 0.92–1.68)
TRIGL SERPL-MCNC: 114 MG/DL (ref 0–150)
TSH SERPL DL<=0.05 MIU/L-ACNC: 2.76 UIU/ML (ref 0.27–4.2)
VLDLC SERPL-MCNC: 21 MG/DL (ref 5–40)
WBC NRBC COR # BLD AUTO: 6.69 10*3/MM3 (ref 3.4–10.8)

## 2024-08-06 PROCEDURE — 80061 LIPID PANEL: CPT

## 2024-08-06 PROCEDURE — 84153 ASSAY OF PSA TOTAL: CPT

## 2024-08-06 PROCEDURE — 82570 ASSAY OF URINE CREATININE: CPT

## 2024-08-06 PROCEDURE — 80050 GENERAL HEALTH PANEL: CPT

## 2024-08-06 PROCEDURE — 82043 UR ALBUMIN QUANTITATIVE: CPT

## 2024-08-06 PROCEDURE — 84439 ASSAY OF FREE THYROXINE: CPT

## 2024-08-06 PROCEDURE — 83036 HEMOGLOBIN GLYCOSYLATED A1C: CPT

## 2024-08-06 PROCEDURE — 36415 COLL VENOUS BLD VENIPUNCTURE: CPT

## 2024-08-07 RX ORDER — METFORMIN HYDROCHLORIDE 500 MG/1
500 TABLET, EXTENDED RELEASE ORAL
Qty: 90 TABLET | Refills: 1 | Status: SHIPPED | OUTPATIENT
Start: 2024-08-07

## 2024-09-16 ENCOUNTER — OFFICE VISIT (OUTPATIENT)
Dept: UROLOGY | Facility: CLINIC | Age: 84
End: 2024-09-16
Payer: COMMERCIAL

## 2024-09-16 VITALS
WEIGHT: 211.2 LBS | HEART RATE: 72 BPM | SYSTOLIC BLOOD PRESSURE: 163 MMHG | HEIGHT: 72 IN | BODY MASS INDEX: 28.61 KG/M2 | DIASTOLIC BLOOD PRESSURE: 78 MMHG

## 2024-09-16 DIAGNOSIS — N13.8 BPH WITH OBSTRUCTION/LOWER URINARY TRACT SYMPTOMS: Primary | ICD-10-CM

## 2024-09-16 DIAGNOSIS — R97.20 ELEVATED PSA: ICD-10-CM

## 2024-09-16 DIAGNOSIS — N40.1 BPH WITH OBSTRUCTION/LOWER URINARY TRACT SYMPTOMS: Primary | ICD-10-CM

## 2024-09-16 LAB
BILIRUB BLD-MCNC: NEGATIVE MG/DL
CLARITY, POC: CLEAR
COLOR UR: YELLOW
EXPIRATION DATE: ABNORMAL
GLUCOSE UR STRIP-MCNC: ABNORMAL MG/DL
KETONES UR QL: ABNORMAL
LEUKOCYTE EST, POC: NEGATIVE
Lab: ABNORMAL
NITRITE UR-MCNC: NEGATIVE MG/ML
PH UR: 5.5 [PH] (ref 5–8)
PROT UR STRIP-MCNC: NEGATIVE MG/DL
RBC # UR STRIP: NEGATIVE /UL
SP GR UR: 1.02 (ref 1–1.03)
UROBILINOGEN UR QL: ABNORMAL

## 2024-09-16 PROCEDURE — 99214 OFFICE O/P EST MOD 30 MIN: CPT | Performed by: UROLOGY

## 2024-09-16 PROCEDURE — 81003 URINALYSIS AUTO W/O SCOPE: CPT | Performed by: UROLOGY

## 2024-09-16 RX ORDER — TAMSULOSIN HYDROCHLORIDE 0.4 MG/1
1 CAPSULE ORAL DAILY
Qty: 90 CAPSULE | Refills: 3 | Status: SHIPPED | OUTPATIENT
Start: 2024-09-16 | End: 2025-09-11

## 2024-11-11 ENCOUNTER — TELEPHONE (OUTPATIENT)
Dept: FAMILY MEDICINE CLINIC | Facility: CLINIC | Age: 84
End: 2024-11-11

## 2024-11-11 NOTE — TELEPHONE ENCOUNTER
The patient called saying he was experiencing double vision and that he was instructed to go to the ER.

## 2024-11-27 RX ORDER — METFORMIN HYDROCHLORIDE 500 MG/1
500 TABLET, EXTENDED RELEASE ORAL
Qty: 90 TABLET | Refills: 1 | OUTPATIENT
Start: 2024-11-27

## 2024-11-29 DIAGNOSIS — E78.5 HYPERLIPIDEMIA, UNSPECIFIED HYPERLIPIDEMIA TYPE: ICD-10-CM

## 2024-11-29 DIAGNOSIS — J30.2 SEASONAL ALLERGIC RHINITIS, UNSPECIFIED TRIGGER: ICD-10-CM

## 2024-12-02 RX ORDER — ROSUVASTATIN CALCIUM 10 MG/1
10 TABLET, COATED ORAL DAILY
Qty: 90 TABLET | Refills: 0 | Status: SHIPPED | OUTPATIENT
Start: 2024-12-02

## 2024-12-02 RX ORDER — CETIRIZINE HYDROCHLORIDE 10 MG/1
10 TABLET ORAL DAILY
Qty: 90 TABLET | Refills: 0 | Status: SHIPPED | OUTPATIENT
Start: 2024-12-02

## 2024-12-18 ENCOUNTER — OFFICE VISIT (OUTPATIENT)
Dept: FAMILY MEDICINE CLINIC | Facility: CLINIC | Age: 84
End: 2024-12-18
Payer: COMMERCIAL

## 2024-12-18 VITALS
WEIGHT: 213 LBS | HEIGHT: 72 IN | TEMPERATURE: 97.5 F | DIASTOLIC BLOOD PRESSURE: 52 MMHG | BODY MASS INDEX: 28.85 KG/M2 | OXYGEN SATURATION: 97 % | SYSTOLIC BLOOD PRESSURE: 138 MMHG | HEART RATE: 75 BPM

## 2024-12-18 DIAGNOSIS — Z13.29 THYROID DISORDER SCREEN: ICD-10-CM

## 2024-12-18 DIAGNOSIS — J30.2 SEASONAL ALLERGIC RHINITIS, UNSPECIFIED TRIGGER: ICD-10-CM

## 2024-12-18 DIAGNOSIS — I10 ESSENTIAL HYPERTENSION: ICD-10-CM

## 2024-12-18 DIAGNOSIS — Z12.5 PROSTATE CANCER SCREENING: ICD-10-CM

## 2024-12-18 DIAGNOSIS — E11.65 TYPE 2 DIABETES MELLITUS WITH HYPERGLYCEMIA, WITHOUT LONG-TERM CURRENT USE OF INSULIN: ICD-10-CM

## 2024-12-18 DIAGNOSIS — R42 DIZZINESS: Primary | ICD-10-CM

## 2024-12-18 PROCEDURE — 99214 OFFICE O/P EST MOD 30 MIN: CPT | Performed by: NURSE PRACTITIONER

## 2024-12-18 RX ORDER — FEXOFENADINE HCL 180 MG/1
180 TABLET ORAL DAILY
Qty: 90 TABLET | Refills: 1 | Status: SHIPPED | OUTPATIENT
Start: 2024-12-18

## 2024-12-18 RX ORDER — METFORMIN HYDROCHLORIDE 500 MG/1
500 TABLET, EXTENDED RELEASE ORAL
Qty: 90 TABLET | Refills: 1 | Status: SHIPPED | OUTPATIENT
Start: 2024-12-18

## 2024-12-18 RX ORDER — HYDROCHLOROTHIAZIDE 12.5 MG/1
12.5 TABLET ORAL DAILY
Qty: 90 TABLET | Refills: 1 | Status: SHIPPED | OUTPATIENT
Start: 2024-12-18

## 2024-12-18 RX ORDER — MECLIZINE HCL 12.5 MG 12.5 MG/1
12.5 TABLET ORAL 3 TIMES DAILY PRN
Qty: 30 TABLET | Refills: 0 | Status: SHIPPED | OUTPATIENT
Start: 2024-12-18

## 2024-12-18 NOTE — PROGRESS NOTES
Chief Complaint  Follow-up, Diabetes, Hypertension, Hyperlipidemia, Benign Prostatic Hypertrophy, and Arthritis    SUBJECTIVE  Lupillo Toledo presents to [facility]    Diabetes Mellitus, type 2: Patient is taking Metformin. Patient is compliant with medications.  Patient's last A1c is 8.10 on 8/6/24. Patient does not monitor blood sugar at home.  Patient denies extreme high and low blood sugars. diet.     Hypertension:  Patient is taking HCTZ.  Patient's Blood Pressure in clinic today is 138/52.  Patient does not monitor blood pressure at home.  Patient denies chest pain, shortness of air, headache, flushing, abnormal swelling in feet/ankles.    Hyperlipidemia:  Patient did not start Rosuvastatin d/t being concerned abouot the side effects.  Patient attempts to maintain a diet low in fat and carbohydrates.    BPH:  Patient is taking Tamsulosin, he is managed per Dr. Augustine, Urology.    Seasonal Allergies:  Patient is no longer taking Zyrtec d/t causing too much post nasal drip.  Patient is requesting Rx for Allegra.    Patient complaining of intermittent dizziness and is requesting Rx for Meclizine.  He states his wife has Rx for same with good results.  States he has had this dizziness off and on for a couple of years now    States only uses meclazine about once every 2-3 weeks, never more than once   History of Present Illness  Past Medical History:   Diagnosis Date    Allergic rhinitis due to allergen 01/12/2021    Arthritis     Bladder disorder     Bowel disease     BPH (benign prostatic hyperplasia)     Cataract     Cervicalgia     Chronic allergic rhinitis     Colon polyps     Diabetes     Difficulty swallowing     Essential hypertension 01/12/2021    Gall stones     Hepatitis     High cholesterol     Hyperlipemia     Hypertension, essential     Kidney stones     Limb swelling     Neuralgia     Primary osteoarthritis of one hip, right 08/23/2017    Seasonal allergies      "Trochanteric bursitis of right hip 08/18/2017      Family History   Problem Relation Age of Onset    Hypertension Father     Bleeding Disorder Mother     Other Mother         BLADDER CALCULUS    Nephrolithiasis Mother     Bleeding Disorder Sister     Bleeding Disorder Brother     Other Other         SPINE PROBLEMS      Past Surgical History:   Procedure Laterality Date    ANKLE SURGERY      BACK SURGERY      CATARACT EXTRACTION      CERVICAL DISC SURGERY      COLONOSCOPY      ENDOSCOPY      HERNIA REPAIR      LUMBAR DISC SURGERY      OTHER SURGICAL HISTORY      ARTIFICAL JOINTS/LIMBS        Current Outpatient Medications:     glucose blood (OneTouch Ultra) test strip, 1 each by Other route As Needed (Check BS QD). Use as instructed, Disp: 90 each, Rfl: 3    hydroCHLOROthiazide 12.5 MG tablet, Take 1 tablet by mouth Daily., Disp: 90 tablet, Rfl: 1    Lancets (onetouch ultrasoft) lancets, 1 each by Other route As Needed (Check BS QD). Use as instructed, Disp: 90 each, Rfl: 3    metFORMIN ER (GLUCOPHAGE-XR) 500 MG 24 hr tablet, Take 1 tablet by mouth Daily With Breakfast., Disp: 90 tablet, Rfl: 1    tamsulosin (FLOMAX) 0.4 MG capsule 24 hr capsule, Take 1 capsule by mouth Daily for 360 days., Disp: 90 capsule, Rfl: 3    fexofenadine (Allegra Allergy) 180 MG tablet, Take 1 tablet by mouth Daily., Disp: 90 tablet, Rfl: 1    meclizine (ANTIVERT) 12.5 MG tablet, Take 1 tablet by mouth 3 (Three) Times a Day As Needed for Dizziness., Disp: 30 tablet, Rfl: 0    OBJECTIVE  Vital Signs:   /52 (BP Location: Left arm, Patient Position: Sitting, Cuff Size: Large Adult)   Pulse 75   Temp 97.5 °F (36.4 °C) (Temporal)   Ht 182.9 cm (72\")   Wt 96.6 kg (213 lb)   SpO2 97%   BMI 28.89 kg/m²    Estimated body mass index is 28.89 kg/m² as calculated from the following:    Height as of this encounter: 182.9 cm (72\").    Weight as of this encounter: 96.6 kg (213 lb).     Wt Readings from Last 3 Encounters:   12/18/24 96.6 kg " (213 lb)   09/16/24 95.8 kg (211 lb 3.2 oz)   06/18/24 97.1 kg (214 lb)     BP Readings from Last 3 Encounters:   12/18/24 138/52   09/16/24 163/78   06/18/24 140/62       Physical Exam  Vitals reviewed.   Constitutional:       Appearance: Normal appearance. He is well-developed.   HENT:      Head: Normocephalic and atraumatic.      Right Ear: External ear normal.      Left Ear: External ear normal.   Eyes:      Conjunctiva/sclera: Conjunctivae normal.      Pupils: Pupils are equal, round, and reactive to light.   Neck:      Vascular: No carotid bruit.   Cardiovascular:      Rate and Rhythm: Normal rate and regular rhythm.      Heart sounds: No murmur heard.     No friction rub. No gallop.   Pulmonary:      Effort: Pulmonary effort is normal.      Breath sounds: Normal breath sounds. No wheezing or rhonchi.   Skin:     General: Skin is warm and dry.   Neurological:      Mental Status: He is alert and oriented to person, place, and time.      Cranial Nerves: No cranial nerve deficit.   Psychiatric:         Mood and Affect: Mood and affect normal.         Behavior: Behavior normal.         Thought Content: Thought content normal.         Judgment: Judgment normal.          Result Review    CMP          8/6/2024    07:42   CMP   Glucose 211    BUN 15    Creatinine 0.98    EGFR 76.5    Sodium 137    Potassium 4.0    Chloride 101    Calcium 9.2    Total Protein 6.4    Albumin 4.2    Globulin 2.2    Total Bilirubin 0.6    Alkaline Phosphatase 61    AST (SGOT) 12    ALT (SGPT) 16    Albumin/Globulin Ratio 1.9    BUN/Creatinine Ratio 15.3    Anion Gap 10.2      CBC          8/6/2024    07:42   CBC   WBC 6.69    RBC 5.18    Hemoglobin 15.3    Hematocrit 45.7    MCV 88.2    MCH 29.5    MCHC 33.5    RDW 12.7    Platelets 164      Lipid Panel          8/6/2024    07:42   Lipid Panel   Total Cholesterol 159    Triglycerides 114    HDL Cholesterol 45    VLDL Cholesterol 21    LDL Cholesterol  93    LDL/HDL Ratio 2.03      TSH           8/6/2024    07:42   TSH   TSH 2.760      Most Recent A1C          8/6/2024    07:42   HGBA1C Most Recent   Hemoglobin A1C 8.10      No Images in the past 120 days found..      The above data has been reviewed by ADOLFO Perkins 12/18/2024 07:08 EST.          Patient Care Team:  Kiersten Moya APRN as PCP - General (Nurse Practitioner)  Jessica Augustine MD as Consulting Physician (Urology)            ASSESSMENT & PLAN    Diagnoses and all orders for this visit:    1. Dizziness (Primary)  Comments:  Discussed use of meclizine very sparingly, discussed increased risk of side effects including urinary retention and falls, patient verbalized understanding  Orders:  -     Comprehensive Metabolic Panel; Future  -     CBC w AUTO Differential; Future  -     TSH; Future  -     Duplex Carotid Ultrasound CAR; Future  -     meclizine (ANTIVERT) 12.5 MG tablet; Take 1 tablet by mouth 3 (Three) Times a Day As Needed for Dizziness.  Dispense: 30 tablet; Refill: 0    2. Essential hypertension  Comments:  Follow-up if no improvement  Orders:  -     Comprehensive Metabolic Panel; Future  -     Lipid Panel; Future  -     hydroCHLOROthiazide 12.5 MG tablet; Take 1 tablet by mouth Daily.  Dispense: 90 tablet; Refill: 1    3. Type 2 diabetes mellitus with hyperglycemia, without long-term current use of insulin  Assessment & Plan:  Previously well-controlled with diet and exercise however in August A1c has jumped to 8.1%, started metformin  mg, will recheck labs today    Orders:  -     Comprehensive Metabolic Panel; Future  -     Lipid Panel; Future  -     Hemoglobin A1c; Future  -     Microalbumin / Creatinine Urine Ratio - Urine, Clean Catch; Future  -     CBC w AUTO Differential; Future  -     metFORMIN ER (GLUCOPHAGE-XR) 500 MG 24 hr tablet; Take 1 tablet by mouth Daily With Breakfast.  Dispense: 90 tablet; Refill: 1    4. Prostate cancer screening  -     Cancel: PSA SCREENING; Future    5. Thyroid disorder  screen  -     TSH; Future    6. Seasonal allergic rhinitis, unspecified trigger  -     fexofenadine (Allegra Allergy) 180 MG tablet; Take 1 tablet by mouth Daily.  Dispense: 90 tablet; Refill: 1         Tobacco Use: Medium Risk (12/18/2024)    Patient History     Smoking Tobacco Use: Former     Smokeless Tobacco Use: Never     Passive Exposure: Past       Follow Up     Return in about 6 months (around 6/18/2025), or if symptoms worsen or fail to improve.      Patient was given instructions and counseling regarding his condition or for health maintenance advice. Please see specific information pulled into the AVS if appropriate.   I have reviewed information obtained and documented by others and I have confirmed the accuracy of this documented note.    ADOLFO Perkins

## 2024-12-18 NOTE — ASSESSMENT & PLAN NOTE
Previously well-controlled with diet and exercise however in August A1c has jumped to 8.1%, started metformin  mg, will recheck labs today

## 2024-12-30 ENCOUNTER — HOSPITAL ENCOUNTER (OUTPATIENT)
Dept: CARDIOLOGY | Facility: HOSPITAL | Age: 84
Discharge: HOME | End: 2024-12-30
Admitting: NURSE PRACTITIONER
Payer: COMMERCIAL

## 2024-12-30 DIAGNOSIS — R42 DIZZINESS: ICD-10-CM

## 2024-12-30 LAB
BH CV XLRA MEAS LEFT CAROTID BULB EDV: 13.1 CM/SEC
BH CV XLRA MEAS LEFT CAROTID BULB PSV: 56.9 CM/SEC
BH CV XLRA MEAS LEFT DIST CCA EDV: 13.6 CM/SEC
BH CV XLRA MEAS LEFT DIST CCA PSV: 59.6 CM/SEC
BH CV XLRA MEAS LEFT DIST ICA EDV: 14.4 CM/SEC
BH CV XLRA MEAS LEFT DIST ICA PSV: 51.7 CM/SEC
BH CV XLRA MEAS LEFT ICA/CCA RATIO: 0.8
BH CV XLRA MEAS LEFT MID ICA EDV: 18 CM/SEC
BH CV XLRA MEAS LEFT MID ICA PSV: 58.3 CM/SEC
BH CV XLRA MEAS LEFT PROX CCA EDV: 13 CM/SEC
BH CV XLRA MEAS LEFT PROX CCA PSV: 79.1 CM/SEC
BH CV XLRA MEAS LEFT PROX ECA EDV: 4.8 CM/SEC
BH CV XLRA MEAS LEFT PROX ECA PSV: 84.4 CM/SEC
BH CV XLRA MEAS LEFT PROX ICA EDV: 14.1 CM/SEC
BH CV XLRA MEAS LEFT PROX ICA PSV: 48.1 CM/SEC
BH CV XLRA MEAS LEFT VERTEBRAL A EDV: 4.6 CM/SEC
BH CV XLRA MEAS LEFT VERTEBRAL A PSV: 34.1 CM/SEC
BH CV XLRA MEAS RIGHT CAROTID BULB EDV: 13.3 CM/SEC
BH CV XLRA MEAS RIGHT CAROTID BULB PSV: 68.6 CM/SEC
BH CV XLRA MEAS RIGHT DIST CCA EDV: 13.8 CM/SEC
BH CV XLRA MEAS RIGHT DIST CCA PSV: 77.9 CM/SEC
BH CV XLRA MEAS RIGHT DIST ICA EDV: 11.7 CM/SEC
BH CV XLRA MEAS RIGHT DIST ICA PSV: 36 CM/SEC
BH CV XLRA MEAS RIGHT ICA/CCA RATIO: 0.8
BH CV XLRA MEAS RIGHT MID ICA EDV: 18.2 CM/SEC
BH CV XLRA MEAS RIGHT MID ICA PSV: 53.8 CM/SEC
BH CV XLRA MEAS RIGHT PROX CCA EDV: 13.5 CM/SEC
BH CV XLRA MEAS RIGHT PROX CCA PSV: 82.4 CM/SEC
BH CV XLRA MEAS RIGHT PROX ECA EDV: 10.3 CM/SEC
BH CV XLRA MEAS RIGHT PROX ECA PSV: 104.7 CM/SEC
BH CV XLRA MEAS RIGHT PROX ICA EDV: 15 CM/SEC
BH CV XLRA MEAS RIGHT PROX ICA PSV: 60.6 CM/SEC
BH CV XLRA MEAS RIGHT VERTEBRAL A EDV: 6.8 CM/SEC
BH CV XLRA MEAS RIGHT VERTEBRAL A PSV: 30.7 CM/SEC
LEFT ARM BP: NORMAL MMHG
RIGHT ARM BP: NORMAL MMHG

## 2024-12-30 PROCEDURE — 93880 EXTRACRANIAL BILAT STUDY: CPT

## 2024-12-31 DIAGNOSIS — I65.02 STENOSIS OF LEFT VERTEBRAL ARTERY: ICD-10-CM

## 2024-12-31 DIAGNOSIS — I65.29 CAROTID ATHEROSCLEROSIS, UNSPECIFIED LATERALITY: Primary | ICD-10-CM

## 2024-12-31 DIAGNOSIS — R42 DIZZINESS: ICD-10-CM

## 2025-01-02 ENCOUNTER — TELEPHONE (OUTPATIENT)
Dept: FAMILY MEDICINE CLINIC | Facility: CLINIC | Age: 85
End: 2025-01-02
Payer: COMMERCIAL

## 2025-01-02 NOTE — TELEPHONE ENCOUNTER
Caller: JERALD ELDER    Relationship to patient: Emergency Contact    Best call back number: [phone]     Patient is needing: CALLER STATED: RETURNING CALL AND REQUESTING A CALL BACK ABOUT VASCULAR SURGEON.   REQUESTING A [facility] PROVIDER AND THAT NOTHING BE SCHEDULED IN [place].

## 2025-01-14 ENCOUNTER — OFFICE VISIT (OUTPATIENT)
Dept: VASCULAR SURGERY | Facility: HOSPITAL | Age: 85
End: 2025-01-14
Payer: COMMERCIAL

## 2025-01-14 ENCOUNTER — PATIENT ROUNDING (BHMG ONLY) (OUTPATIENT)
Dept: VASCULAR SURGERY | Facility: HOSPITAL | Age: 85
End: 2025-01-14
Payer: COMMERCIAL

## 2025-01-14 VITALS
BODY MASS INDEX: 28.85 KG/M2 | SYSTOLIC BLOOD PRESSURE: 146 MMHG | WEIGHT: 213 LBS | TEMPERATURE: 97.9 F | RESPIRATION RATE: 18 BRPM | HEART RATE: 64 BPM | OXYGEN SATURATION: 95 % | DIASTOLIC BLOOD PRESSURE: 84 MMHG | HEIGHT: 72 IN

## 2025-01-14 DIAGNOSIS — I65.23 CAROTID ARTERY PLAQUE, BILATERAL: ICD-10-CM

## 2025-01-14 DIAGNOSIS — R42 DIZZINESS AND GIDDINESS: Primary | ICD-10-CM

## 2025-01-14 PROCEDURE — 99213 OFFICE O/P EST LOW 20 MIN: CPT | Performed by: NURSE PRACTITIONER

## 2025-01-14 NOTE — PROGRESS NOTES
[facility] New Patient Office Note     Date of Encounter: 01/14/2025     MRN Number: [id number]  Name: Lupillo Toledo  Phone Number: [phone]     Referred By: Kiersten Moya AP*  PCP: Kiersten Moya APRN    Chief Complaint:    Chief Complaint   Patient presents with    Carotid Artery Disease     Patient is here as a new referral from primary care with a chief complaint of dizziness. Patient has had a recent carotid duplex completed.        Subjective      History of Present Illness:    Lupillo Toledo is a 84 y.o. male presents for presents for a referral from ADOLFO Fajardo.  Mr. Quintanilla explains that he had a couple episodes of dizziness with 1 episode that lead to vomiting.  He had been prescribed meclizine however has not had to take it. He is concerned of what is causing the episodes and has not been able to pinpoint what precipitates the spell.  He is active throughout the day and is consistent with a healthy diet.  He is accompanied today by his wife.  No complaints at this time    Review of Systems:  ROS  Review of Systems   Constitutional: Negative.   HENT: Negative.    Cardiovascular: Negative.    Respiratory: Negative.    Skin: Negative.    Musculoskeletal: Negative.    Gastrointestinal: Negative.    Neurological: Negative.    Psychiatric/Behavioral: Negative.     I have reviewed the following portions of the patient's history: problem list, current medications, allergies, past surgical history, past medical history, past social history, past family history, and ROS and confirm it's accurate.    Allergies:  No Known Allergies    Medications:      Current Outpatient Medications:     fexofenadine (Allegra Allergy) 180 MG tablet, Take 1 tablet by mouth Daily., Disp: 90 tablet, Rfl: 1    glucose blood (OneTouch Ultra) test strip, 1 each by Other route As Needed (Check BS QD). Use as instructed, Disp: 90 each, Rfl: 3    hydroCHLOROthiazide 12.5 MG tablet,  Take 1 tablet by mouth Daily., Disp: 90 tablet, Rfl: 1    Lancets (onetouch ultrasoft) lancets, 1 each by Other route As Needed (Check BS QD). Use as instructed, Disp: 90 each, Rfl: 3    meclizine (ANTIVERT) 12.5 MG tablet, Take 1 tablet by mouth 3 (Three) Times a Day As Needed for Dizziness., Disp: 30 tablet, Rfl: 0    metFORMIN ER (GLUCOPHAGE-XR) 500 MG 24 hr tablet, Take 1 tablet by mouth Daily With Breakfast., Disp: 90 tablet, Rfl: 1    tamsulosin (FLOMAX) 0.4 MG capsule 24 hr capsule, Take 1 capsule by mouth Daily for 360 days., Disp: 90 capsule, Rfl: 3    History:   Past Medical History:   Diagnosis Date    Allergic rhinitis due to allergen 01/12/2021    Arthritis     Bladder disorder     Bowel disease     BPH (benign prostatic hyperplasia)     Cataract     Cervicalgia     Chronic allergic rhinitis     Colon polyps     Diabetes     Difficulty swallowing     Essential hypertension 01/12/2021    Gall stones     Hepatitis     High cholesterol     Hyperlipemia     Hypertension, essential     Kidney stones     Limb swelling     Neuralgia     Primary osteoarthritis of one hip, right 08/23/2017    Seasonal allergies     Trochanteric bursitis of right hip 08/18/2017       Past Surgical History:   Procedure Laterality Date    ANKLE SURGERY      BACK SURGERY      CATARACT EXTRACTION      CERVICAL DISC SURGERY      COLONOSCOPY      ENDOSCOPY      HERNIA REPAIR      LUMBAR DISC SURGERY      OTHER SURGICAL HISTORY      ARTIFICAL JOINTS/LIMBS       Social History     Socioeconomic History    Marital status:    Tobacco Use    Smoking status: Former     Types: Cigarettes     Passive exposure: Past    Smokeless tobacco: Never    Tobacco comments:     STARTED AT AGE 21 AND STOPPED AT AGE 34   Vaping Use    Vaping status: Never Used   Substance and Sexual Activity    Alcohol use: Not Currently    Drug use: Not Currently    Sexual activity: Defer        Family History   Problem Relation Age of Onset    Hypertension Father   "   Bleeding Disorder Mother     Other Mother         BLADDER CALCULUS    Nephrolithiasis Mother     Bleeding Disorder Sister     Bleeding Disorder Brother     Other Other         SPINE PROBLEMS       Objective     Physical Exam:  Vitals:    01/14/25 0841   BP: 146/84   BP Location: Right arm   Patient Position: Sitting   Cuff Size: Large Adult   Pulse: 64   Resp: 18   Temp: 97.9 °F (36.6 °C)   TempSrc: Temporal   SpO2: 95%   Weight: 96.6 kg (213 lb)   Height: 182.9 cm (72\")   PainSc: 0-No pain      Body mass index is 28.89 kg/m².          Physical Exam   Physical Exam  Constitutional:       Appearance: Normal  HENT:      Head: Normocephalic and atraumatic.   Eyes:      Pupils: Pupils are equal, round, and reactive to light.   Neck:      Comments: No bruit.   Cardiovascular:      Rate and Rhythm: Normal rate and regular rhythm. Mild lower extremity edema.  Pulmonary:      Effort: Pulmonary effort is normal.   Musculoskeletal:      Cervical back: Normal range of motion and neck supple.   Skin:     General: Skin is warm and dry.   Neurological:      General: No focal deficit present.      Mental Status: Alert and oriented to person, place, and time.     Imaging/Labs:  I have reviewed the carotid duplex that was performed on 12/30/2024.  The duplex reveals no evidence of significant stenosis noted bilaterally.  No radiology results for the last 30 days.   Assessment / Plan      Assessment / Plan:  Diagnoses and all orders for this visit:    1. Dizziness and giddiness (Primary)    2. Carotid artery plaque, bilateral    Mr. Toledo has experienced several episodes of dizziness along with blurred/double vision. The carotid duplex revealed evidence of plaque but no stenosis is noted. We had a long discussion concerning symptoms, testing and interventions, including that he would prefer not to take a statin medication. No vascular intervention is recommended at this time, follow up in 2 years with a carotid duplex. I have " explained should he have any additional concerns or worsening signs or symptoms that he may follow-up sooner.    I have answered all of their questions and they are in agreement with the plan at this time.  Thank you for allowing me to participate in your patient's care.    Patient Education: discussed BP, Cholesterol and diet management to help reduce risk factors.        Follow Up:   Return 2 year follow up with carotid.   Or sooner for any further concerns or worsening sign and symptoms. If unable to reach us in the office please dial 911 or go to the nearest emergency department.      ADOLFO Napoles  [facility]

## 2025-02-14 ENCOUNTER — PATIENT ROUNDING (BHMG ONLY) (OUTPATIENT)
Dept: URGENT CARE | Facility: CLINIC | Age: 85
End: 2025-02-14
Payer: COMMERCIAL

## 2025-02-14 NOTE — ED NOTES
Thank you for letting us care for you in your recent visit to our urgent care center. We would love to hear about your experience with us. Was this the first time you have visited our location?    We're always looking for ways to make our patients' experiences even better. Do you have any recommendations on ways we may improve?     I appreciate you taking the time to respond. Please be on the lookout for a survey about your recent visit from [employer] via text or email. We would greatly appreciate if you could fill that out and turn it back in. We want your voice to be heard and we value your feedback.   Thank you for choosing [facility] for your healthcare needs.

## 2025-02-18 ENCOUNTER — LAB (OUTPATIENT)
Dept: LAB | Facility: HOSPITAL | Age: 85
End: 2025-02-18
Payer: COMMERCIAL

## 2025-02-18 DIAGNOSIS — I10 ESSENTIAL HYPERTENSION: ICD-10-CM

## 2025-02-18 DIAGNOSIS — R42 DIZZINESS: ICD-10-CM

## 2025-02-18 DIAGNOSIS — E11.65 TYPE 2 DIABETES MELLITUS WITH HYPERGLYCEMIA, WITHOUT LONG-TERM CURRENT USE OF INSULIN: ICD-10-CM

## 2025-02-18 DIAGNOSIS — Z13.29 THYROID DISORDER SCREEN: ICD-10-CM

## 2025-02-18 LAB
ALBUMIN SERPL-MCNC: 4.2 G/DL (ref 3.5–5.2)
ALBUMIN UR-MCNC: 2.7 MG/DL
ALBUMIN/GLOB SERPL: 1.4 G/DL
ALP SERPL-CCNC: 63 U/L (ref 39–117)
ALT SERPL W P-5'-P-CCNC: 20 U/L (ref 1–41)
ANION GAP SERPL CALCULATED.3IONS-SCNC: 13.2 MMOL/L (ref 5–15)
AST SERPL-CCNC: 15 U/L (ref 1–40)
BASOPHILS # BLD AUTO: 0.04 10*3/MM3 (ref 0–0.2)
BASOPHILS NFR BLD AUTO: 0.6 % (ref 0–1.5)
BILIRUB SERPL-MCNC: 0.7 MG/DL (ref 0–1.2)
BUN SERPL-MCNC: 16 MG/DL (ref 8–23)
BUN/CREAT SERPL: 15.1 (ref 7–25)
CALCIUM SPEC-SCNC: 9.1 MG/DL (ref 8.6–10.5)
CHLORIDE SERPL-SCNC: 100 MMOL/L (ref 98–107)
CHOLEST SERPL-MCNC: 266 MG/DL (ref 0–200)
CO2 SERPL-SCNC: 23.8 MMOL/L (ref 22–29)
CREAT SERPL-MCNC: 1.06 MG/DL (ref 0.76–1.27)
CREAT UR-MCNC: 79.5 MG/DL
DEPRECATED RDW RBC AUTO: 40.2 FL (ref 37–54)
EGFRCR SERPLBLD CKD-EPI 2021: 69.6 ML/MIN/1.73
EOSINOPHIL # BLD AUTO: 0.28 10*3/MM3 (ref 0–0.4)
EOSINOPHIL NFR BLD AUTO: 4.3 % (ref 0.3–6.2)
ERYTHROCYTE [DISTWIDTH] IN BLOOD BY AUTOMATED COUNT: 12.7 % (ref 12.3–15.4)
GLOBULIN UR ELPH-MCNC: 2.9 GM/DL
GLUCOSE SERPL-MCNC: 194 MG/DL (ref 65–99)
HBA1C MFR BLD: 8.6 % (ref 4.8–5.6)
HCT VFR BLD AUTO: 46.4 % (ref 37.5–51)
HDLC SERPL-MCNC: 42 MG/DL (ref 40–60)
HGB BLD-MCNC: 16 G/DL (ref 13–17.7)
IMM GRANULOCYTES # BLD AUTO: 0.02 10*3/MM3 (ref 0–0.05)
IMM GRANULOCYTES NFR BLD AUTO: 0.3 % (ref 0–0.5)
LDLC SERPL CALC-MCNC: 201 MG/DL (ref 0–100)
LDLC/HDLC SERPL: 4.72 {RATIO}
LYMPHOCYTES # BLD AUTO: 2.29 10*3/MM3 (ref 0.7–3.1)
LYMPHOCYTES NFR BLD AUTO: 35.3 % (ref 19.6–45.3)
MCH RBC QN AUTO: 30.1 PG (ref 26.6–33)
MCHC RBC AUTO-ENTMCNC: 34.5 G/DL (ref 31.5–35.7)
MCV RBC AUTO: 87.4 FL (ref 79–97)
MICROALBUMIN/CREAT UR: 34 MG/G (ref 0–29)
MONOCYTES # BLD AUTO: 0.56 10*3/MM3 (ref 0.1–0.9)
MONOCYTES NFR BLD AUTO: 8.6 % (ref 5–12)
NEUTROPHILS NFR BLD AUTO: 3.29 10*3/MM3 (ref 1.7–7)
NEUTROPHILS NFR BLD AUTO: 50.9 % (ref 42.7–76)
NRBC BLD AUTO-RTO: 0 /100 WBC (ref 0–0.2)
PLATELET # BLD AUTO: 202 10*3/MM3 (ref 140–450)
PMV BLD AUTO: 9.8 FL (ref 6–12)
POTASSIUM SERPL-SCNC: 4 MMOL/L (ref 3.5–5.2)
PROT SERPL-MCNC: 7.1 G/DL (ref 6–8.5)
RBC # BLD AUTO: 5.31 10*6/MM3 (ref 4.14–5.8)
SODIUM SERPL-SCNC: 137 MMOL/L (ref 136–145)
TRIGL SERPL-MCNC: 128 MG/DL (ref 0–150)
TSH SERPL DL<=0.05 MIU/L-ACNC: 2.4 UIU/ML (ref 0.27–4.2)
VLDLC SERPL-MCNC: 23 MG/DL (ref 5–40)
WBC NRBC COR # BLD AUTO: 6.48 10*3/MM3 (ref 3.4–10.8)

## 2025-02-18 PROCEDURE — 83036 HEMOGLOBIN GLYCOSYLATED A1C: CPT

## 2025-02-18 PROCEDURE — 80061 LIPID PANEL: CPT

## 2025-02-18 PROCEDURE — 82043 UR ALBUMIN QUANTITATIVE: CPT

## 2025-02-18 PROCEDURE — 36415 COLL VENOUS BLD VENIPUNCTURE: CPT

## 2025-02-18 PROCEDURE — 82570 ASSAY OF URINE CREATININE: CPT

## 2025-02-18 PROCEDURE — 80050 GENERAL HEALTH PANEL: CPT

## 2025-02-19 RX ORDER — METFORMIN HYDROCHLORIDE 750 MG/1
750 TABLET, EXTENDED RELEASE ORAL
Qty: 90 TABLET | Refills: 1 | Status: SHIPPED | OUTPATIENT
Start: 2025-02-19

## 2025-03-04 DIAGNOSIS — J30.2 SEASONAL ALLERGIC RHINITIS, UNSPECIFIED TRIGGER: ICD-10-CM

## 2025-03-05 RX ORDER — CETIRIZINE HYDROCHLORIDE 10 MG/1
10 TABLET ORAL DAILY
Qty: 90 TABLET | Refills: 0 | OUTPATIENT
Start: 2025-03-05

## 2025-03-05 NOTE — TELEPHONE ENCOUNTER
12/18/24 Seasonal Allergies:  Patient is no longer taking Zyrtec d/t causing too much post nasal drip.  Patient is requesting Rx for Allegra.

## 2025-03-10 DIAGNOSIS — E78.5 HYPERLIPIDEMIA, UNSPECIFIED HYPERLIPIDEMIA TYPE: ICD-10-CM

## 2025-03-10 RX ORDER — ROSUVASTATIN CALCIUM 10 MG/1
10 TABLET, COATED ORAL DAILY
Qty: 90 TABLET | Refills: 0 | OUTPATIENT
Start: 2025-03-10

## 2025-03-10 NOTE — TELEPHONE ENCOUNTER
12/18/24   Hyperlipidemia:  Patient did not start Rosuvastatin d/t being concerned abouot the side effects.  Patient attempts to maintain a diet low in fat and carbohydrates.

## 2025-03-14 ENCOUNTER — TELEPHONE (OUTPATIENT)
Dept: FAMILY MEDICINE CLINIC | Facility: CLINIC | Age: 85
End: 2025-03-14
Payer: COMMERCIAL

## 2025-03-14 ENCOUNTER — OFFICE VISIT (OUTPATIENT)
Dept: FAMILY MEDICINE CLINIC | Facility: CLINIC | Age: 85
End: 2025-03-14
Payer: COMMERCIAL

## 2025-03-14 VITALS
WEIGHT: 203.6 LBS | DIASTOLIC BLOOD PRESSURE: 62 MMHG | HEIGHT: 70 IN | SYSTOLIC BLOOD PRESSURE: 129 MMHG | OXYGEN SATURATION: 95 % | HEART RATE: 63 BPM | BODY MASS INDEX: 29.15 KG/M2

## 2025-03-14 DIAGNOSIS — L03.113 CELLULITIS OF RIGHT UPPER EXTREMITY: Primary | ICD-10-CM

## 2025-03-14 DIAGNOSIS — R42 LIGHTHEADEDNESS: ICD-10-CM

## 2025-03-14 RX ORDER — SULFAMETHOXAZOLE AND TRIMETHOPRIM 800; 160 MG/1; MG/1
1 TABLET ORAL 2 TIMES DAILY
Qty: 14 TABLET | Refills: 0 | Status: SHIPPED | OUTPATIENT
Start: 2025-03-14 | End: 2025-03-21

## 2025-03-14 RX ORDER — FLUTICASONE PROPIONATE 50 MCG
2 SPRAY, SUSPENSION (ML) NASAL DAILY
Qty: 16 G | Refills: 0 | Status: SHIPPED | OUTPATIENT
Start: 2025-03-14

## 2025-03-14 RX ORDER — GINSENG 100 MG
1 CAPSULE ORAL 2 TIMES DAILY
Qty: 28 G | Refills: 0 | Status: SHIPPED | OUTPATIENT
Start: 2025-03-14

## 2025-03-14 NOTE — TELEPHONE ENCOUNTER
Caller: JERALD ELDER    Relationship: Emergency Contact    Best call back number:   [phone]  What medication are you requesting: FLONASE    If a prescription is needed, what is your preferred pharmacy and phone number: [id number] - [place], [place] - [address] AT [facility] [address] - [phone]  - [phone] FX

## 2025-03-14 NOTE — PROGRESS NOTES
Chief Complaint   Patient presents with    Trauma     R arm not healing   Happened 6 weeks ago   Was put on KEFLEX for 10 day   Has been washing out with Peroxide         Subjective     Lupillo Toledo  has a past medical history of Allergic rhinitis due to allergen (01/12/2021), Arthritis, Bladder disorder, Bowel disease, BPH (benign prostatic hyperplasia), Cataract, Cervicalgia, Chronic allergic rhinitis, Colon polyps, Diabetes, Difficulty swallowing, Essential hypertension (01/12/2021), Gall stones, Hepatitis, High cholesterol, Hyperlipemia, Hypertension, essential, Kidney stones, Limb swelling, Neuralgia, Primary osteoarthritis of one hip, right (08/23/2017), Seasonal allergies, and Trochanteric bursitis of right hip (08/18/2017).    Trauma  Associated symptoms include light-headedness.       History of Present Illness  The patient is an 84-year-old male who presents today with a cut on the right arm that is not healing. The cut happened 6 weeks ago. He went to urgent care on 02/14/2025 and was given Keflex at that time and diagnosed with cellulitis.    He sustained a laceration on his right arm approximately 6 weeks ago while tending to plants in his basement. The injury occurred when he inadvertently struck his arm against the corner of a shelf, resulting in a skin tear and significant bleeding. Despite initial treatment with peroxide and thorough cleaning, the wound has not shown signs of improvement. He sought medical attention at an urgent care facility where he was prescribed a 2-week course of Keflex, which unfortunately did not yield any noticeable improvement. He has been managing the wound by periodically draining it of pus using a sterilized needle. The wound exhibits persistent redness and soreness, with occasional scabbing. He has been applying a mixture of baking soda and peroxide to the wound for the past week. He reports that the wound appears to be healing, but he experiences intermittent  stinging sensations and soreness. He also notes that the wound tends to close up, necessitating reopening due to the presence of pus underneath. He has previously been treated with Bactrim.    He has been experiencing lightheadedness, which he attributes to a recent upper respiratory infection. He reports increased sleep duration and decreased appetite. He also experienced diarrhea, leading him to suspect a different viral infection. His lightheadedness has been a chronic issue, persisting for over a year, but has worsened in recent days. He underwent a carotid ultrasound in 12/2024, which revealed normal results. He also consulted with a vascular specialist who found no abnormalities. 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 He suffers from seasonal allergies and was previously on Allegra. He has used Flonase for his allergies and has found meclizine to be somewhat effective in managing his lightheadedness.      No Known Allergies      Current Outpatient Medications:     fexofenadine (Allegra Allergy) 180 MG tablet, Take 1 tablet by mouth Daily., Disp: 90 tablet, Rfl: 1    glucose blood (OneTouch Ultra) test strip, 1 each by Other route As Needed (Check BS QD). Use as instructed, Disp: 90 each, Rfl: 3    hydroCHLOROthiazide 12.5 MG tablet, Take 1 tablet by mouth Daily., Disp: 90 tablet, Rfl: 1    Lancets (onetouch ultrasoft) lancets, 1 each by Other route As Needed (Check BS QD). Use as instructed, Disp: 90 each, Rfl: 3    meclizine (ANTIVERT) 12.5 MG tablet, Take 1 tablet by mouth 3 (Three) Times a Day As Needed for Dizziness., Disp: 30 tablet, Rfl: 0    metFORMIN ER (GLUCOPHAGE-XR) 750 MG 24 hr tablet, Take 1 tablet by mouth Daily With Breakfast., Disp: 90 tablet, Rfl: 1    tamsulosin (FLOMAX) 0.4 MG capsule 24 hr capsule, Take 1 capsule by mouth Daily for 360 days., Disp: 90 capsule, Rfl: 3    bacitracin 500 UNIT/GM  ointment, Apply 1 Application topically to the appropriate area as directed 2 (Two) Times a Day., Disp: 28 g, Rfl: 0    sulfamethoxazole-trimethoprim (BACTRIM DS,SEPTRA DS) 800-160 MG per tablet, Take 1 tablet by mouth 2 (Two) Times a Day for 7 days., Disp: 14 tablet, Rfl: 0    Patient Active Problem List   Diagnosis    Bladder disorder    Bowel disease    Cataract    Diabetes    Difficulty swallowing    Essential hypertension    Arthritis    Hyperlipemia    Seasonal allergic rhinitis    Trigger finger of right hand    Osteoarthritis of carpometacarpal (CMC) joint of right thumb    Bilateral hand pain    Trigger ring finger of left hand    Trigger middle finger of left hand    Elevated PSA    Cardiac murmur    Trigger ring finger of right hand    Trigger index finger of right hand    Trigger finger of right thumb    Elevated blood pressure reading    Lower extremity edema    Vertigo        Past Surgical History:   Procedure Laterality Date    ANKLE SURGERY      BACK SURGERY      CATARACT EXTRACTION      CERVICAL DISC SURGERY      COLONOSCOPY      ENDOSCOPY      HERNIA REPAIR      LUMBAR DISC SURGERY      OTHER SURGICAL HISTORY      ARTIFICAL JOINTS/LIMBS       Social History     Socioeconomic History    Marital status:    Tobacco Use    Smoking status: Former     Types: Cigarettes     Passive exposure: Past    Smokeless tobacco: Never    Tobacco comments:     STARTED AT AGE 21 AND STOPPED AT AGE 34   Vaping Use    Vaping status: Never Used   Substance and Sexual Activity    Alcohol use: Not Currently    Drug use: Not Currently    Sexual activity: Defer       Family History   Problem Relation Age of Onset    Hypertension Father     Bleeding Disorder Mother     Other Mother         BLADDER CALCULUS    Nephrolithiasis Mother     Bleeding Disorder Sister     Bleeding Disorder Brother     Other Other         SPINE PROBLEMS       Family history, surgical history, past medical history, Allergies and med's reviewed  "with patient today and updated in [facility] EMR.     ROS:  Review of Systems   Respiratory: Negative.     Cardiovascular: Negative.    Skin:  Positive for wound.   Neurological:  Positive for light-headedness.       OBJECTIVE:  Vitals:    03/14/25 1343   BP: 129/62   Pulse: 63   SpO2: 95%   Weight: 92.4 kg (203 lb 9.6 oz)   Height: 177.8 cm (70\")     Body mass index is 29.21 kg/m².  No LMP for male patient.    Physical Exam  HENT:      Right Ear: A middle ear effusion is present.      Left Ear: A middle ear effusion is present.   Cardiovascular:      Rate and Rhythm: Normal rate and regular rhythm.      Pulses: Normal pulses.      Heart sounds: Normal heart sounds.   Pulmonary:      Effort: Pulmonary effort is normal.      Breath sounds: Normal breath sounds.   Skin:            Comments: Erythematous, warm quarter sized wound on right forearm   Neurological:      General: No focal deficit present.   Psychiatric:         Mood and Affect: Mood normal.         Behavior: Behavior normal.         Thought Content: Thought content normal.         Judgment: Judgment normal.         Physical Exam  There is a little bit of fluid in the ears.    Procedures            Health Maintenance Due   Topic Date Due    DIABETIC FOOT EXAM  Never done    ZOSTER VACCINE (1 of 2) Never done    ANNUAL PHYSICAL  Never done    INFLUENZA VACCINE  Never done    COVID-19 Vaccine (1 - 2024-25 season) Never done        Lab on 02/18/2025   Component Date Value Ref Range Status    Glucose 02/18/2025 194 (H)  65 - 99 mg/dL Final    BUN 02/18/2025 16  8 - 23 mg/dL Final    Creatinine 02/18/2025 1.06  0.76 - 1.27 mg/dL Final    Sodium 02/18/2025 137  136 - 145 mmol/L Final    Potassium 02/18/2025 4.0  3.5 - 5.2 mmol/L Final    Chloride 02/18/2025 100  98 - 107 mmol/L Final    CO2 02/18/2025 23.8  22.0 - 29.0 mmol/L Final    Calcium 02/18/2025 9.1  8.6 - 10.5 mg/dL Final    Total Protein 02/18/2025 7.1  6.0 - 8.5 g/dL Final    Albumin 02/18/2025 4.2  3.5 - " 5.2 g/dL Final    ALT (SGPT) 02/18/2025 20  1 - 41 U/L Final    AST (SGOT) 02/18/2025 15  1 - 40 U/L Final    Alkaline Phosphatase 02/18/2025 63  39 - 117 U/L Final    Total Bilirubin 02/18/2025 0.7  0.0 - 1.2 mg/dL Final    Globulin 02/18/2025 2.9  gm/dL Final    A/G Ratio 02/18/2025 1.4  g/dL Final    BUN/Creatinine Ratio 02/18/2025 15.1  7.0 - 25.0 Final    Anion Gap 02/18/2025 13.2  5.0 - 15.0 mmol/L Final    eGFR 02/18/2025 69.6  >60.0 mL/min/1.73 Final    Total Cholesterol 02/18/2025 266 (H)  0 - 200 mg/dL Final    Triglycerides 02/18/2025 128  0 - 150 mg/dL Final    HDL Cholesterol 02/18/2025 42  40 - 60 mg/dL Final    LDL Cholesterol  02/18/2025 201 (H)  0 - 100 mg/dL Final    VLDL Cholesterol 02/18/2025 23  5 - 40 mg/dL Final    LDL/HDL Ratio 02/18/2025 4.72   Final    Hemoglobin A1C 02/18/2025 8.60 (H)  4.80 - 5.60 % Final    TSH 02/18/2025 2.400  0.270 - 4.200 uIU/mL Final    WBC 02/18/2025 6.48  3.40 - 10.80 10*3/mm3 Final    RBC 02/18/2025 5.31  4.14 - 5.80 10*6/mm3 Final    Hemoglobin 02/18/2025 16.0  13.0 - 17.7 g/dL Final    Hematocrit 02/18/2025 46.4  37.5 - 51.0 % Final    MCV 02/18/2025 87.4  79.0 - 97.0 fL Final    MCH 02/18/2025 30.1  26.6 - 33.0 pg Final    MCHC 02/18/2025 34.5  31.5 - 35.7 g/dL Final    RDW 02/18/2025 12.7  12.3 - 15.4 % Final    RDW-SD 02/18/2025 40.2  37.0 - 54.0 fl Final    MPV 02/18/2025 9.8  6.0 - 12.0 fL Final    Platelets 02/18/2025 202  140 - 450 10*3/mm3 Final    Neutrophil % 02/18/2025 50.9  42.7 - 76.0 % Final    Lymphocyte % 02/18/2025 35.3  19.6 - 45.3 % Final    Monocyte % 02/18/2025 8.6  5.0 - 12.0 % Final    Eosinophil % 02/18/2025 4.3  0.3 - 6.2 % Final    Basophil % 02/18/2025 0.6  0.0 - 1.5 % Final    Immature Grans % 02/18/2025 0.3  0.0 - 0.5 % Final    Neutrophils, Absolute 02/18/2025 3.29  1.70 - 7.00 10*3/mm3 Final    Lymphocytes, Absolute 02/18/2025 2.29  0.70 - 3.10 10*3/mm3 Final    Monocytes, Absolute 02/18/2025 0.56  0.10 - 0.90 10*3/mm3 Final     Eosinophils, Absolute 02/18/2025 0.28  0.00 - 0.40 10*3/mm3 Final    Basophils, Absolute 02/18/2025 0.04  0.00 - 0.20 10*3/mm3 Final    Immature Grans, Absolute 02/18/2025 0.02  0.00 - 0.05 10*3/mm3 Final    nRBC 02/18/2025 0.0  0.0 - 0.2 /100 WBC Final    Microalbumin/Creatinine Ratio 02/18/2025 34.0 (H)  0.0 - 29.0 mg/g Final    Creatinine, Urine 02/18/2025 79.5  mg/dL Final    Microalbumin, Urine 02/18/2025 2.7  mg/dL Final       Results  Imaging  Ultrasound of carotid arteries showed no issues.      ASSESSMENT/ PLAN:    Diagnoses and all orders for this visit:    1. Cellulitis of right upper extremity (Primary)  -     bacitracin 500 UNIT/GM ointment; Apply 1 Application topically to the appropriate area as directed 2 (Two) Times a Day.  Dispense: 28 g; Refill: 0  -     sulfamethoxazole-trimethoprim (BACTRIM DS,SEPTRA DS) 800-160 MG per tablet; Take 1 tablet by mouth 2 (Two) Times a Day for 7 days.  Dispense: 14 tablet; Refill: 0    2. Lightheadedness  Comments:  Flonase        Assessment & Plan  1. Cellulitis.  The wound on his right arm remains erythematous, warm to touch, and painful, indicating a need for additional oral antibiotic therapy. A prescription for Bactrim will be sent to [facility]. Additionally, a topical antibiotic, bacitracin, will be prescribed for twice-daily application to the wound. He is advised to discontinue soaking the wound and instead use fragrance-free antibacterial soap for cleaning.    2. Lightheadedness.  He exhibits mild fluid accumulation in the ears, which is common with seasonal allergies. His recent bout of diarrhea may have resulted in slight dehydration, contributing to his lightheadedness. He is advised to use Flonase, administering 2 puffs once daily, to alleviate the fluid accumulation in the ears. He is also encouraged to continue his current allergy medications.    Orders Placed Today:     New Medications Ordered This Visit   Medications    bacitracin  500 UNIT/GM ointment     Sig: Apply 1 Application topically to the appropriate area as directed 2 (Two) Times a Day.     Dispense:  28 g     Refill:  0    sulfamethoxazole-trimethoprim (BACTRIM DS,SEPTRA DS) 800-160 MG per tablet     Sig: Take 1 tablet by mouth 2 (Two) Times a Day for 7 days.     Dispense:  14 tablet     Refill:  0        Management Plan:     An After Visit Summary was printed and given to the patient at discharge.    Follow-up: No follow-ups on file.    Patient or patient representative verbalized consent for the use of Ambient Listening during the visit with  ADOLFO Michel for chart documentation. 3/14/2025  14:23 EDT    ADOLFO Michel 3/14/2025 14:27 EDT  This note was electronically signed.

## 2025-05-20 ENCOUNTER — OFFICE VISIT (OUTPATIENT)
Dept: FAMILY MEDICINE CLINIC | Facility: CLINIC | Age: 85
End: 2025-05-20
Payer: COMMERCIAL

## 2025-05-20 VITALS
HEART RATE: 62 BPM | SYSTOLIC BLOOD PRESSURE: 124 MMHG | HEIGHT: 70 IN | BODY MASS INDEX: 29.35 KG/M2 | DIASTOLIC BLOOD PRESSURE: 62 MMHG | OXYGEN SATURATION: 98 % | WEIGHT: 205 LBS

## 2025-05-20 DIAGNOSIS — J30.2 SEASONAL ALLERGIC RHINITIS, UNSPECIFIED TRIGGER: ICD-10-CM

## 2025-05-20 DIAGNOSIS — I10 ESSENTIAL HYPERTENSION: ICD-10-CM

## 2025-05-20 DIAGNOSIS — E11.65 TYPE 2 DIABETES MELLITUS WITH HYPERGLYCEMIA, WITHOUT LONG-TERM CURRENT USE OF INSULIN: ICD-10-CM

## 2025-05-20 DIAGNOSIS — E78.5 HYPERLIPIDEMIA, UNSPECIFIED HYPERLIPIDEMIA TYPE: Primary | ICD-10-CM

## 2025-05-20 PROCEDURE — 99214 OFFICE O/P EST MOD 30 MIN: CPT | Performed by: NURSE PRACTITIONER

## 2025-05-20 RX ORDER — METFORMIN HYDROCHLORIDE 750 MG/1
750 TABLET, EXTENDED RELEASE ORAL
Qty: 90 TABLET | Refills: 1 | Status: SHIPPED | OUTPATIENT
Start: 2025-05-20

## 2025-05-20 RX ORDER — HYDROCHLOROTHIAZIDE 12.5 MG/1
12.5 TABLET ORAL DAILY
Qty: 90 TABLET | Refills: 1 | Status: SHIPPED | OUTPATIENT
Start: 2025-05-20

## 2025-05-20 RX ORDER — FLUTICASONE PROPIONATE 50 MCG
2 SPRAY, SUSPENSION (ML) NASAL DAILY
Qty: 16 G | Refills: 0 | Status: SHIPPED | OUTPATIENT
Start: 2025-05-20

## 2025-05-20 RX ORDER — FEXOFENADINE HCL 180 MG/1
180 TABLET ORAL DAILY
Qty: 90 TABLET | Refills: 1 | Status: SHIPPED | OUTPATIENT
Start: 2025-05-20

## 2025-05-20 NOTE — PROGRESS NOTES
Chief Complaint  Diabetes and Hyperlipidemia    SUBJECTIVE  Lupillo Toledo presents to [facility] for three month follow up on Diabetes and Hyperlipidemia.    Pt is not taking his statin medication, states that he does not want to risk the potential side effects, has been working on diet changes.      Pt is taking the Metformin 750 mg, states tolerating well.    Pt has managed to lose about 10lbs since February, states has really been cutting back on sugar and eating better overall    History of Present Illness  Past Medical History:   Diagnosis Date    Allergic rhinitis due to allergen 01/12/2021    Arthritis     Bladder disorder     Bowel disease     BPH (benign prostatic hyperplasia)     Cataract     Cervicalgia     Chronic allergic rhinitis     Colon polyps     Diabetes     Difficulty swallowing     Essential hypertension 01/12/2021    Gall stones     Hepatitis     High cholesterol     Hyperlipemia     Hypertension, essential     Kidney stones     Limb swelling     Neuralgia     Primary osteoarthritis of one hip, right 08/23/2017    Seasonal allergies     Trochanteric bursitis of right hip 08/18/2017      Family History   Problem Relation Age of Onset    Hypertension Father     Bleeding Disorder Mother     Other Mother         BLADDER CALCULUS    Nephrolithiasis Mother     Bleeding Disorder Sister     Bleeding Disorder Brother     Other Other         SPINE PROBLEMS      Past Surgical History:   Procedure Laterality Date    ANKLE SURGERY      BACK SURGERY      CATARACT EXTRACTION      CERVICAL DISC SURGERY      COLONOSCOPY      ENDOSCOPY      HERNIA REPAIR      LUMBAR DISC SURGERY      OTHER SURGICAL HISTORY      ARTIFICAL JOINTS/LIMBS        Current Outpatient Medications:     bacitracin 500 UNIT/GM ointment, Apply 1 Application topically to the appropriate area as directed 2 (Two) Times a Day., Disp: 28 g, Rfl: 0    fexofenadine (Allegra Allergy) 180 MG tablet, Take 1 tablet  "by mouth Daily., Disp: 90 tablet, Rfl: 1    fluticasone (FLONASE) 50 MCG/ACT nasal spray, Administer 2 sprays into the nostril(s) as directed by provider Daily., Disp: 16 g, Rfl: 0    glucose blood (OneTouch Ultra) test strip, 1 each by Other route As Needed (Check BS QD). Use as instructed, Disp: 90 each, Rfl: 3    hydroCHLOROthiazide 12.5 MG tablet, Take 1 tablet by mouth Daily., Disp: 90 tablet, Rfl: 1    Lancets (onetouch ultrasoft) lancets, 1 each by Other route As Needed (Check BS QD). Use as instructed, Disp: 90 each, Rfl: 3    meclizine (ANTIVERT) 12.5 MG tablet, Take 1 tablet by mouth 3 (Three) Times a Day As Needed for Dizziness., Disp: 30 tablet, Rfl: 0    metFORMIN ER (GLUCOPHAGE-XR) 750 MG 24 hr tablet, Take 1 tablet by mouth Daily With Breakfast., Disp: 90 tablet, Rfl: 1    tamsulosin (FLOMAX) 0.4 MG capsule 24 hr capsule, Take 1 capsule by mouth Daily for 360 days., Disp: 90 capsule, Rfl: 3    OBJECTIVE  Vital Signs:   /62   Pulse 62   Ht 177.8 cm (70\")   Wt 93 kg (205 lb)   SpO2 98%   BMI 29.41 kg/m²    Estimated body mass index is 29.41 kg/m² as calculated from the following:    Height as of this encounter: 177.8 cm (70\").    Weight as of this encounter: 93 kg (205 lb).     Wt Readings from Last 3 Encounters:   05/20/25 93 kg (205 lb)   03/14/25 92.4 kg (203 lb 9.6 oz)   02/14/25 97.1 kg (214 lb 1.6 oz)     BP Readings from Last 3 Encounters:   05/20/25 124/62   03/14/25 129/62   02/14/25 150/68       Physical Exam  Vitals reviewed.   Constitutional:       Appearance: Normal appearance. He is well-developed.   HENT:      Head: Normocephalic and atraumatic.      Right Ear: External ear normal.      Left Ear: External ear normal.   Eyes:      Conjunctiva/sclera: Conjunctivae normal.      Pupils: Pupils are equal, round, and reactive to light.   Cardiovascular:      Rate and Rhythm: Normal rate and regular rhythm.      Heart sounds: No murmur heard.     No friction rub. No gallop. "   Pulmonary:      Effort: Pulmonary effort is normal.      Breath sounds: Normal breath sounds. No wheezing or rhonchi.   Skin:     General: Skin is warm and dry.   Neurological:      Mental Status: He is alert and oriented to person, place, and time.      Cranial Nerves: No cranial nerve deficit.   Psychiatric:         Mood and Affect: Mood and affect normal.         Behavior: Behavior normal.         Thought Content: Thought content normal.         Judgment: Judgment normal.          Result Review    CMP          8/6/2024    07:42 2/18/2025    08:57   CMP   Glucose 211  194    BUN 15  16    Creatinine 0.98  1.06    EGFR 76.5  69.6    Sodium 137  137    Potassium 4.0  4.0    Chloride 101  100    Calcium 9.2  9.1    Total Protein 6.4  7.1    Albumin 4.2  4.2    Globulin 2.2  2.9    Total Bilirubin 0.6  0.7    Alkaline Phosphatase 61  63    AST (SGOT) 12  15    ALT (SGPT) 16  20    Albumin/Globulin Ratio 1.9  1.4    BUN/Creatinine Ratio 15.3  15.1    Anion Gap 10.2  13.2      CBC          8/6/2024    07:42 2/18/2025    08:57   CBC   WBC 6.69  6.48    RBC 5.18  5.31    Hemoglobin 15.3  16.0    Hematocrit 45.7  46.4    MCV 88.2  87.4    MCH 29.5  30.1    MCHC 33.5  34.5    RDW 12.7  12.7    Platelets 164  202      Lipid Panel          8/6/2024    07:42 2/18/2025    08:57   Lipid Panel   Total Cholesterol 159  266    Triglycerides 114  128    HDL Cholesterol 45  42    VLDL Cholesterol 21  23    LDL Cholesterol  93  201    LDL/HDL Ratio 2.03  4.72      TSH          8/6/2024    07:42 2/18/2025    08:57   TSH   TSH 2.760  2.400          No Images in the past 120 days found..     The above data has been reviewed by ADOLFO Perkins 05/20/2025 09:43 EDT.          Patient Care Team:  Kiersten Moya APRN as PCP - General (Nurse Practitioner)  Jessica Augustine MD as Consulting Physician (Urology)            ASSESSMENT & PLAN    Diagnoses and all orders for this visit:    1. Hyperlipidemia, unspecified  hyperlipidemia type (Primary)  Assessment & Plan:  Patient has been continued Crestor due to fear of side effects, has been working on diet changes, we will recheck lipids today    Orders:  -     Lipid panel; Future    2. Type 2 diabetes mellitus with hyperglycemia, without long-term current use of insulin  Assessment & Plan:  Patient had significant jump in most recent A1c, he admitted he had not been taking the medication regularly, he has been taking it regularly for the last 3 months, we will recheck A1c today    Orders:  -     Hemoglobin A1c; Future  -     metFORMIN ER (GLUCOPHAGE-XR) 750 MG 24 hr tablet; Take 1 tablet by mouth Daily With Breakfast.  Dispense: 90 tablet; Refill: 1    3. Essential hypertension  Assessment & Plan:  Blood pressure well-controlled at present, continue current medication    Orders:  -     hydroCHLOROthiazide 12.5 MG tablet; Take 1 tablet by mouth Daily.  Dispense: 90 tablet; Refill: 1    4. Essential hypertension  Comments:  Follow-up if no improvement  Assessment & Plan:  Blood pressure well-controlled at present, continue current medication    Orders:  -     hydroCHLOROthiazide 12.5 MG tablet; Take 1 tablet by mouth Daily.  Dispense: 90 tablet; Refill: 1    5. Seasonal allergic rhinitis, unspecified trigger  Assessment & Plan:  Stable controlled at present, continue current medications    Orders:  -     fluticasone (FLONASE) 50 MCG/ACT nasal spray; Administer 2 sprays into the nostril(s) as directed by provider Daily.  Dispense: 16 g; Refill: 0  -     fexofenadine (Allegra Allergy) 180 MG tablet; Take 1 tablet by mouth Daily.  Dispense: 90 tablet; Refill: 1         Tobacco Use: Medium Risk (5/20/2025)    Patient History     Smoking Tobacco Use: Former     Smokeless Tobacco Use: Never     Passive Exposure: Past       Follow Up     Return in about 6 months (around 11/20/2025), or if symptoms worsen or fail to improve.        Patient was given instructions and counseling regarding  his condition or for health maintenance advice. Please see specific information pulled into the AVS if appropriate.   I have reviewed information obtained and documented by others and I have confirmed the accuracy of this documented note.    ADOLFO Perkins

## 2025-05-20 NOTE — ASSESSMENT & PLAN NOTE
Patient had significant jump in most recent A1c, he admitted he had not been taking the medication regularly, he has been taking it regularly for the last 3 months, we will recheck A1c today

## 2025-05-20 NOTE — ASSESSMENT & PLAN NOTE
Patient has been continued Crestor due to fear of side effects, has been working on diet changes, we will recheck lipids today

## 2025-05-22 ENCOUNTER — LAB (OUTPATIENT)
Dept: LAB | Facility: HOSPITAL | Age: 85
End: 2025-05-22
Payer: COMMERCIAL

## 2025-05-22 DIAGNOSIS — E11.65 TYPE 2 DIABETES MELLITUS WITH HYPERGLYCEMIA, WITHOUT LONG-TERM CURRENT USE OF INSULIN: ICD-10-CM

## 2025-05-22 DIAGNOSIS — E78.5 HYPERLIPIDEMIA, UNSPECIFIED HYPERLIPIDEMIA TYPE: ICD-10-CM

## 2025-05-22 LAB
CHOLEST SERPL-MCNC: 215 MG/DL (ref 0–200)
HBA1C MFR BLD: 6.9 % (ref 4.8–5.6)
HDLC SERPL-MCNC: 48 MG/DL (ref 40–60)
LDLC SERPL CALC-MCNC: 152 MG/DL (ref 0–100)
LDLC/HDLC SERPL: 3.13 {RATIO}
TRIGL SERPL-MCNC: 85 MG/DL (ref 0–150)
VLDLC SERPL-MCNC: 15 MG/DL (ref 5–40)

## 2025-05-22 PROCEDURE — 36415 COLL VENOUS BLD VENIPUNCTURE: CPT

## 2025-05-22 PROCEDURE — 80061 LIPID PANEL: CPT

## 2025-05-22 PROCEDURE — 83036 HEMOGLOBIN GLYCOSYLATED A1C: CPT

## 2025-05-23 DIAGNOSIS — R42 DIZZINESS: ICD-10-CM

## 2025-05-27 RX ORDER — MECLIZINE HCL 12.5 MG 12.5 MG/1
12.5 TABLET ORAL 2 TIMES DAILY PRN
Qty: 15 TABLET | Refills: 0 | Status: SHIPPED | OUTPATIENT
Start: 2025-05-27

## 2025-07-30 DIAGNOSIS — J30.2 SEASONAL ALLERGIC RHINITIS, UNSPECIFIED TRIGGER: ICD-10-CM

## 2025-07-30 RX ORDER — FLUTICASONE PROPIONATE 50 MCG
SPRAY, SUSPENSION (ML) NASAL
Qty: 16 G | Refills: 0 | Status: SHIPPED | OUTPATIENT
Start: 2025-07-30